# Patient Record
Sex: FEMALE | Race: BLACK OR AFRICAN AMERICAN | NOT HISPANIC OR LATINO | Employment: UNEMPLOYED | URBAN - METROPOLITAN AREA
[De-identification: names, ages, dates, MRNs, and addresses within clinical notes are randomized per-mention and may not be internally consistent; named-entity substitution may affect disease eponyms.]

---

## 2017-05-23 ENCOUNTER — OFFICE VISIT (OUTPATIENT)
Dept: FAMILY MEDICINE | Facility: CLINIC | Age: 57
End: 2017-05-23

## 2017-05-23 VITALS
SYSTOLIC BLOOD PRESSURE: 129 MMHG | BODY MASS INDEX: 39.73 KG/M2 | OXYGEN SATURATION: 100 % | WEIGHT: 224.2 LBS | DIASTOLIC BLOOD PRESSURE: 85 MMHG | HEIGHT: 63 IN | HEART RATE: 72 BPM | TEMPERATURE: 98.2 F

## 2017-05-23 DIAGNOSIS — Z01.818 PREOP GENERAL PHYSICAL EXAM: Primary | ICD-10-CM

## 2017-05-23 DIAGNOSIS — Z53.9 ERRONEOUS ENCOUNTER--DISREGARD: Primary | ICD-10-CM

## 2017-05-23 LAB
BUN SERPL-MCNC: 10 MG/DL (ref 7–30)
CALCIUM SERPL-MCNC: 9.2 MG/DL (ref 8.5–10.4)
CHLORIDE SERPLBLD-SCNC: 105 MMOL/L (ref 94–109)
CO2 SERPL-SCNC: 29 MMOL/L (ref 20–32)
CREAT SERPL-MCNC: 0.7 MG/DL (ref 0.6–1.3)
EGFR CALCULATED (BLACK REFERENCE): >90 ML/MIN
EGFR CALCULATED (NON BLACK REFERENCE): >90 ML/MIN
GLUCOSE SERPL-MCNC: 124 MG/DL (ref 60–109)
HCT VFR BLD AUTO: 43.5 % (ref 35–47)
HEMOGLOBIN: 13.7 G/DL (ref 11.7–15.7)
MCH RBC QN AUTO: 29.7 PG (ref 26.5–35)
MCHC RBC AUTO-ENTMCNC: 31.5 G/DL (ref 32–36)
MCV RBC AUTO: 94.4 FL (ref 78–100)
PLATELET # BLD AUTO: 238 K/UL (ref 150–450)
POTASSIUM SERPL-SCNC: 3.9 MMOL/L (ref 3.4–5.3)
RBC # BLD AUTO: 4.61 M/UL (ref 3.8–5.2)
SODIUM SERPL-SCNC: 142 MMOL/L (ref 133–144)
WBC # BLD AUTO: 7.7 K/UL (ref 4–11)

## 2017-05-23 NOTE — NURSING NOTE
Establish care--will come back for establish care  Mammogram--decline  Colonoscopy & fit test--pt decline  MARIAH--pt could not remember her previous clinic that she use to go to so unable to obtain.

## 2017-05-23 NOTE — MR AVS SNAPSHOT
After Visit Summary   5/23/2017    Bear Obregon    MRN: [id number]           Patient Information     Date Of Birth          12/22/1959        Visit Information        Provider Department      5/23/2017 9:00 AM Jessica Almanza APRN CNP [facility]        Today's Diagnoses     Preop general physical exam    -  1      Care Instructions      Presurgery Checklist  You are scheduled to have surgery. The healthcare staff will try to make your stay comfortable. Use the guidelines below to remind yourself what to do before surgery. Be sure to follow any specific pre-op instructions from your surgeon or nurse.   Preparing for Surgery  Ask your surgeon if you ll need a blood transfusion during surgery and if so, how to prepare for it. In some cases, you can donate blood before surgery. If needed, this blood can be given back (transfused) to you during or after surgery.  If you are having abdominal surgery, ask what you need to do to clear your bowel.  Tell your surgeon if you have allergies to any medications or foods.  Arrange for an adult family member or friend to drive you home after surgery. If possible, have someone ready to help you at home as you recover.  Call the surgeon if you get a cold, fever, sore throat, diarrhea, or other health problem just before surgery. Your surgeon can decide whether or not to postpone the surgery.  Medications  Tell your surgeon about all medications you take, including prescription and over-the-counter products such as herbal remedies and vitamins. Ask if you should continue taking them.  If you take ibuprofen, naproxen, or  blood thinners  such as aspirin, clopidogrel (Plavix), or warfarin (Coumadin), ask your surgeon whether you should stop taking them and how long before surgery you should stop.  You may be told to take antibiotics just before surgery to prevent infection. If so, follow instructions carefully on how to take them.  If you are told to  take medications called anticoagulants to prevent blood clots after surgery, be sure to follow the instructions on how to take them.  Stop Smoking  If you smoke, healing may take longer. So at least 2 week(s) before surgery, stop smoking.  Bathing or Showering Before Surgery  If instructed, wash with antibacterial soap. Afterward, do not use lotions or powders.  If you are having surgery on the head, you may be asked to shampoo with antibacterial soap. Follow instructions for doing so.  Do Not Remove Hair from the Surgery Site  Do not shave hair from the incision site, unless you are given specific instructions to do so. Usually, if hair needs to be removed, it will be done at the hospital right before surgery.  Don t Eat or Drink  Your doctor will tell you when to stop eating and drinking. If you do not follow your doctor's instructions, your procedure may be postponed or rescheduled for another day.  If your surgeon tells you to continue any medications, take them with small sips of water.  You can brush your teeth and rinse your mouth, but don t swallow any water.  Day of Surgery  Do not wear makeup. Do not use perfume, deodorant, or hairspray. Remove nail polish and artificial nails.  Leave jewelry (including rings), watches, and other valuables at home.  Be sure to bring health insurance cards or forms and a photo ID.  Bring a list of your medications (include the name, dose, how often you take them, and the time last dose was taken).  Arrive on time at the hospital or surgery facility.        Follow-ups after your visit        Follow-up notes from your care team     Return in about 4 weeks (around 6/20/2017), or if symptoms worsen or fail to improve, for Lab Work, Routine Visit, Physical Exam, BP Recheck.      Who to contact     Please call your clinic at [phone] to:    Ask questions about your health    Make or cancel appointments    Discuss your medicines    Learn about your test results    Speak to  "your doctor   If you have compliments or concerns about an experience at your clinic, or if you wish to file a complaint, please contact [facility] Patient Relations at [phone] or email us at [email or web address]         Additional Information About Your Visit        Care EveryWhere ID     This is your Care EveryWhere ID. This could be used by other organizations to access your [facility] medical records  [id number]        Your Vitals Were     Pulse Temperature Height Pulse Oximetry BMI (Body Mass Index)       72 98.2  F (36.8  C) (Oral) 5' 3\" (160 cm) 100% 39.72 kg/m2        Blood Pressure from Last 3 Encounters:   05/23/17 129/85   09/03/08 105/69   08/12/08 122/74    Weight from Last 3 Encounters:   05/23/17 224 lb 3.2 oz (101.7 kg)   09/03/08 229 lb (103.9 kg)   08/12/08 234 lb (106.1 kg)              We Performed the Following     Basic Metabolic Panel ([facility]) - Results < 1 hr     CBC with Plt ([facility])     EKG 12-lead complete w/read - Clinics        Primary Care Provider    [facility] Other Clinic                Thank you!     Thank you for choosing [facility]  for your care. Our goal is always to provide you with excellent care. Hearing back from our patients is one way we can continue to improve our services. Please take a few minutes to complete the written survey that you may receive in the mail after your visit with us. Thank you!             Your Updated Medication List - Protect others around you: Learn how to safely use, store and throw away your medicines at www.disposemymeds.org.          This list is accurate as of: 5/23/17  6:52 PM.  Always use your most recent med list.                   Brand Name Dispense Instructions for use    omeprazole 20 MG CR capsule    priLOSEC    90 capsule    Take 1 capsule (20 mg) by mouth daily       sertraline 50 MG tablet    ZOLOFT    90 tablet    Take 1 tablet (50 mg) by mouth daily         "

## 2017-05-23 NOTE — LETTER
May 24, 2017      Bear Obregon  [address] [place] [address]        Dear Zernia,    Hello, Zernia,    Your lab results were normal, with the exception of a mildly elevated glucose, not surprising as it was drawn this morning, without your having a chance to fast for 8 hours or more.  Electrolytes and kidney function are normal. You have no signs of anemia or obvious infection on your blood count.    I have forwarded the lab results with your pre-op notes that were completed this afternoon, after our computer access returned!    Please call if you have questions, at [phone].    Best wishes on a complete recovery,    Please see below for your test results.    Resulted Orders   CBC with Plt ([facility])   Result Value Ref Range    WBC 7.7 4.0 - 11.0 K/uL    RBC 4.61 3.80 - 5.20 M/uL    Hemoglobin 13.7 11.7 - 15.7 g/dL    Hematocrit 43.5 35.0 - 47.0 %    MCV 94.4 78.0 - 100.0 fL    MCH 29.7 26.5 - 35.0 pg    MCHC 31.5 (L) 32.0 - 36.0 g/dL    Platelets 238.0 150.0 - 450.0 K/uL   Basic Metabolic Panel ([facility]) - Results < 1 hr   Result Value Ref Range    Glucose 124.0 (H) 60.0 - 109.0 mg/dL    Urea Nitrogen 10.0 7.0 - 30.0 mg/dL    Creatinine 0.7 0.6 - 1.3 mg/dL    Sodium 142.0 133.0 - 144.0 mmol/L    Potassium 3.9 3.4 - 5.3 mmol/L    Chloride 105.0 94.0 - 109.0 mmol/L    Carbon Dioxide 29.0 20.0 - 32.0 mmol/L    Calcium 9.2 8.5 - 10.4 mg/dL    eGFR Calculated (Non Black Reference) >90 >60.0 mL/min    eGFR Calculated (Black Reference) >90 >60.0 mL/min       If you have any questions, please call the clinic to make an appointment.    Sincerely,    CLAUDIA Erazo CNP

## 2017-05-23 NOTE — PROGRESS NOTES
[facility] [address] [place] [address]  Phone: [phone]  Fax: [phone]    5/23/2017    Adult PRE-OP Evaluation:  Bear Obregon, 12/22/1959 presents for pre-operative evaluation and assessment as requested by Dr Álvarez  At [facility], prior to undergoing surgery/procedure for treatment of cartilage tear of left knee.  Proposed procedure: Arthroscopy Left knee.    Date of Surgery/ Procedure: 5/31/17  Hospital/Surgical Facility: [facility]     Primary Physician: Has had care in the [facility] system but most recently has not had a PCP  Type of Anesthesia Anticipated: General  History of anesthesia complications: NONE  History of  abnormal bleeding: NONE   History of blood transfusions: NO  Patient has a Health Care Directive or Living Will:  NO    Preoperative Questions   1. NO - Do you have a history of heart attack, stroke, stent, bypass or surgery on an artery in the head, neck, heart or legs?  2. NO - Do you ever have any pain or discomfort in your chest?  3. NO - Have you ever had a severe pain across the front of your chest lasting for half an hour or more?  4. NO - Do you have a history of Congestive Heart Failure?  5. NO - Are you troubled by shortness of breath when: walking on the level/ up a slight hill/ at night?  6. NO - Does your chest ever sound wheezy or whistling?  7. NO - Do you currently have a cold, bronchitis or other respiratory infection?  8. NO - Have you had a cold, bronchitis or other respiratory infection within the last 2 weeks?  9. NO - Do you usually have a cough?  10. NO - Do you sometimes get pains in the calves of your legs when you walk?  11. YES - Do you or anyone in your family have previous history of blood clots?   Patient had a blood clot of her left foot at age 23, and the clot traveled up to the left calf.  No recurrence in recent years.  12. NO - Do you or does anyone in your family have a serious bleeding problem such as  "prolonged bleeding following surgeries or cuts?  13. NO - Have you ever had problems with anemia or been told to take iron pills?  14. NO - Have you had any abnormal blood loss such as black, tarry or bloody stools, or abnormal vaginal bleeding?  15. NO - Have you ever had a blood transfusion?  16. NO - Have you or any of your relatives ever had problems with anesthesia?  17. NO - Do you have sleep apnea, excessive snoring or daytime drowsiness?  18. NO - Do you have any prosthetic heart valves?  19. NO - Do you have prosthetic joints?  20. NO - Is there any chance that you may be pregnant?  Past Medical History:   Diagnosis Date     Depressive disorder, not elsewhere classified     divorce, no meds     Disorders of bursae and tendons in shoulder region, unspecified      Disorders of bursae and tendons in shoulder region, unspecified      Other and unspecified hyperlipidemia 4/17/2007     Personal history of urinary calculi      Superficial injury of cornea     left     Tobacco use disorder     quit     Patient Active Problem List   Diagnosis     Hyperlipidemia     Tobacco use disorder     Allergic rhinitis     Obesity     OTC products: NSAIDS- ibuprofen- last yesterday    Allergies   Allergen Reactions     Sulfa Drugs Hives and Itching     Hives and itching     Latex Allergy: NO    Social History   Patient is single, and mother of 3 children- 11 son and 2 daughters, ages 37-41.  Is disabled due to chronic pain. Used to work as a patient care assistant in the remote past.   Smokes cigarettes, \"less than I used to smoke.\" Has no problems with alcohol or illicit drug use.      REVIEW OF SYSTEMS:   Constitutional, HEENT, cardiovascular, pulmonary, gi and gu systems are negative, except as otherwise noted.    EXAM:   Patient Vitals for the past 24 hrs:   BP Temp Temp src Pulse SpO2 Height Weight   05/23/17 0915 129/85 98.2  F (36.8  C) Oral 72 100 % 5' 3\" (160 cm) 224 lb 3.2 oz (101.7 kg)     Body mass index is 39.72 " kg/(m^2).  GENERAL: Alert, overweight and in no resting distress  EYES: Eyes grossly normal to inspection, extraocular movements - intact, and PERRL  HENT: ear canals- normal; TMs- normal; Nose- normal; Mouth- no ulcers, no lesions  NECK: no tenderness, no adenopathy, no asymmetry, no masses, no stiffness; thyroid- normal to palpation  RESP: lungs clear to auscultation - no rales, no rhonchi, no wheezes  CV: regular rates and rhythm, normal S1 S2, no S3 or S4 and no murmur, no click or rub -  ABDOMEN: soft, no tenderness, no  hepatosplenomegaly, no masses, normal bowel sounds  MS: extremities- no gross deformities noted, no edema  SKIN: no suspicious lesions, no rashes  NEURO: strength and tone- normal, sensory exam- grossly normal, mentation- intact, speech- normal, reflexes- symmetric  PSYCH: Alert and oriented times 3; speech- coherent , normal rate and volume; able to articulate logical thoughts      DIAGNOSTICS:      EKG: sinus bradycardia, rate 59, normal axis, normal intervals, no acute ST/T changes c/w ischemia,   no prior tracings available    RISK ASSESSMENT:     Cardiovascular Risk:  -Patient is able to perform ADL's without assistance without chest pain.  -The patient does not have chest pain at rest.  -Patient does not have a history of congestive heart failure.    -The patient does not have a history of stroke and does not have a history of valvular disease.    Pulmonary Risk:  -In terms of risk factors for pulmonary complication, the patient has no risk factors    Perioperative Complications:  -The patient has a history of a blood clot to her LLE in the remote past x1. This has not recurred in spite of   Appendectomy,Hysterectomy() and  deliveries x3. I n addition has had  Bilateral rotator cuff surgeries without complications.  -The patient has not had complications from surgeries.    -The patient does not have a family history of any known anesthesia or surgical complications.       IMPRESSION:   Reason for surgery/procedure: Arthroscopic surgery L knee for treatment of L knee pain  The proposed surgical procedure is considered INTERMEDIATE risk.    For above listed surgery and anesthesia:   Patient is at moderate risk for surgery/procedure and perioperative/procedure complications.    RECOMMENDATIONS:     Labs:  Last Basic Metabolic Panel:  Lab Results   Component Value Date    .0 05/23/2017      Lab Results   Component Value Date    POTASSIUM 3.9 05/23/2017     Lab Results   Component Value Date    CHLORIDE 105.0 05/23/2017       Lab Results   Component Value Date    CO2 29.0 05/23/2017     Lab Results   Component Value Date    BUN 10.0 05/23/2017     Lab Results   Component Value Date    CR 0.7 05/23/2017     Lab Results   Component Value Date    .0 05/23/2017     CBC RESULTS:   Recent Labs   Lab Test  05/23/17   1525   HGB  13.7   HCT  43.5   MCV  94.4   MCH  29.7   MCHC  31.5*   WBC                                                                              7.7  Platelets                                                                        238    Fasting:  NPO for 12 hours prior to surgery    Preop Plan:  --Approval given to proceed with proposed procedure, without further diagnostic evaluation    Medications:  Patient should take their regular medications (Sertraline)  the morning of surgery unless otherwise instructed.    Hold aspirin 7 days prior to surgery.  Hold ibuprofen for 5 days prior.      CLAUDIA Erazo CNP    Please contact our office if there are any further questions or information required about this patient.

## 2017-05-23 NOTE — PATIENT INSTRUCTIONS
Presurgery Checklist  You are scheduled to have surgery. The healthcare staff will try to make your stay comfortable. Use the guidelines below to remind yourself what to do before surgery. Be sure to follow any specific pre-op instructions from your surgeon or nurse.   Preparing for Surgery  Ask your surgeon if you ll need a blood transfusion during surgery and if so, how to prepare for it. In some cases, you can donate blood before surgery. If needed, this blood can be given back (transfused) to you during or after surgery.  If you are having abdominal surgery, ask what you need to do to clear your bowel.  Tell your surgeon if you have allergies to any medications or foods.  Arrange for an adult family member or friend to drive you home after surgery. If possible, have someone ready to help you at home as you recover.  Call the surgeon if you get a cold, fever, sore throat, diarrhea, or other health problem just before surgery. Your surgeon can decide whether or not to postpone the surgery.  Medications  Tell your surgeon about all medications you take, including prescription and over-the-counter products such as herbal remedies and vitamins. Ask if you should continue taking them.  If you take ibuprofen, naproxen, or  blood thinners  such as aspirin, clopidogrel (Plavix), or warfarin (Coumadin), ask your surgeon whether you should stop taking them and how long before surgery you should stop.  You may be told to take antibiotics just before surgery to prevent infection. If so, follow instructions carefully on how to take them.  If you are told to take medications called anticoagulants to prevent blood clots after surgery, be sure to follow the instructions on how to take them.  Stop Smoking  If you smoke, healing may take longer. So at least 2 week(s) before surgery, stop smoking.  Bathing or Showering Before Surgery  If instructed, wash with antibacterial soap. Afterward, do not use lotions or powders.  If you  are having surgery on the head, you may be asked to shampoo with antibacterial soap. Follow instructions for doing so.  Do Not Remove Hair from the Surgery Site  Do not shave hair from the incision site, unless you are given specific instructions to do so. Usually, if hair needs to be removed, it will be done at the hospital right before surgery.  Don t Eat or Drink  Your doctor will tell you when to stop eating and drinking. If you do not follow your doctor's instructions, your procedure may be postponed or rescheduled for another day.  If your surgeon tells you to continue any medications, take them with small sips of water.  You can brush your teeth and rinse your mouth, but don t swallow any water.  Day of Surgery  Do not wear makeup. Do not use perfume, deodorant, or hairspray. Remove nail polish and artificial nails.  Leave jewelry (including rings), watches, and other valuables at home.  Be sure to bring health insurance cards or forms and a photo ID.  Bring a list of your medications (include the name, dose, how often you take them, and the time last dose was taken).  Arrive on time at the hospital or surgery facility.    Pre-op faxed to fax number :  [phone]  Location :  [facility]  Date of Surgery :  5/31/2017  By/Date :  ZOHAIB Griffith 5/24/2017

## 2017-05-25 ASSESSMENT — PATIENT HEALTH QUESTIONNAIRE - PHQ9: SUM OF ALL RESPONSES TO PHQ QUESTIONS 1-9: 17

## 2018-02-05 ENCOUNTER — OFFICE VISIT (OUTPATIENT)
Dept: FAMILY MEDICINE | Facility: CLINIC | Age: 58
End: 2018-02-05
Payer: MEDICARE

## 2018-02-05 VITALS
WEIGHT: 226.6 LBS | HEART RATE: 76 BPM | SYSTOLIC BLOOD PRESSURE: 144 MMHG | HEIGHT: 62 IN | BODY MASS INDEX: 41.7 KG/M2 | RESPIRATION RATE: 18 BRPM | DIASTOLIC BLOOD PRESSURE: 81 MMHG | TEMPERATURE: 98.1 F | OXYGEN SATURATION: 100 %

## 2018-02-05 DIAGNOSIS — F43.20 ADJUSTMENT DISORDER, UNSPECIFIED TYPE: ICD-10-CM

## 2018-02-05 DIAGNOSIS — M25.531 RIGHT WRIST PAIN: Primary | ICD-10-CM

## 2018-02-05 DIAGNOSIS — K21.9 GASTROESOPHAGEAL REFLUX DISEASE, ESOPHAGITIS PRESENCE NOT SPECIFIED: ICD-10-CM

## 2018-02-05 RX ORDER — INDOMETHACIN 25 MG/1
25 CAPSULE ORAL 2 TIMES DAILY WITH MEALS
Qty: 14 CAPSULE | Refills: 0 | Status: SHIPPED | OUTPATIENT
Start: 2018-02-05 | End: 2018-02-28

## 2018-02-05 RX ORDER — LIDOCAINE 50 MG/G
OINTMENT TOPICAL PRN
Qty: 100 G | Refills: 1 | Status: SHIPPED | OUTPATIENT
Start: 2018-02-05 | End: 2018-02-28

## 2018-02-05 RX ORDER — KETOROLAC TROMETHAMINE 10 MG/1
10 TABLET, FILM COATED ORAL EVERY 6 HOURS PRN
Qty: 30 TABLET | Refills: 0 | Status: SHIPPED | OUTPATIENT
Start: 2018-02-05 | End: 2018-02-28

## 2018-02-05 NOTE — PROGRESS NOTES
"[facility]        Subjective     CC: Patient presents with:  Musculoskeletal Problem: Right wrist pain and swelling has been using creams and ibuprofen. Alternating heat and ice. Off and on x2 months and steady for 1 week. Had carpal tunnel surgery 5 years ago  Med Rec: Complete. Has not been taking medication because no refills. Would like refills on both medication      HPI: Bear Obregon is a 58 year old female with history of obesity.      She presents today with progressive right wrist pain.  She says she first noted it about 2 months ago but then became much worse over the last week.  She is wondering if it is related to her carpal tunnel surgery which she had 5 years ago.  She has been using ibuprofen along with some creams to try to help cut down on the pain.  She is also taken some over-the-counter Tylenol.  She denies any fevers or chills.  She says the pain is focused at her wrist and does not radiate up and down her arm.      ROS: constitutional, cardiovascular, respiratory, GI, , MSK systems reviewed and negative except as noted above.    Social: Ex-smoker          Objective   /81 (BP Location: Right arm, Patient Position: Sitting, Cuff Size: Adult Large)  Pulse 76  Temp 98.1  F (36.7  C) (Oral)  Resp 18  Ht 5' 2.25\" (158.1 cm)  Wt 226 lb 9.6 oz (102.8 kg)  SpO2 100%  BMI 41.11 kg/m2 Body mass index is 41.11 kg/(m^2).  General: Overweight female in no acute distress  Extremities: Right wrist with significant tenderness to only light palpation about 1 cm proximal to the radial head along dorsal aspect.  She is able to flex her thumb without much difficulty but it is quite painful.           Assessment & Plan     1. Right wrist pain  Pain is almost allodynic in nature.  This could be gout but seems unlikely.  More likely suspect some sort of tendinitis.   Rx sent in for indomethacin initially but this was not covered by insurance and so we switched to Toradol.  Hope " was to try different kind of NSAIDs than the ibuprofen that she has been using.  She will follow-up later this week for reevaluation.  - indomethacin (INDOCIN) 25 MG capsule; Take 1 capsule (25 mg) by mouth 2 times daily (with meals) Do not take on the same day as ibuprofen  Dispense: 14 capsule; Refill: 0  - lidocaine (XYLOCAINE) 5 % ointment; Apply topically as needed for moderate pain (Patient not taking: Reported on 2/9/2018)  Dispense: 100 g; Refill: 1  - ketorolac (TORADOL) 10 MG tablet; Take 1 tablet (10 mg) by mouth every 6 hours as needed for moderate pain Do not take ibuprofen on the same day  Dispense: 30 tablet; Refill: 0    2. Gastroesophageal reflux disease, esophagitis presence not specified  We did not have time to evaluate this today formally.  Her prior PPI was refilled.  She will need to follow-up further to discuss this.  - omeprazole (PRILOSEC) 20 MG CR capsule; Take 1 capsule (20 mg) by mouth daily  Dispense: 90 capsule; Refill: 1    3. Adjustment disorder, unspecified type  Again did not have time to look into this further.  She will need to follow-up to investigate this further.  - sertraline (ZOLOFT) 50 MG tablet; Take 1 tablet (50 mg) by mouth daily  Dispense: 90 tablet; Refill: 1    Precepted today with: MD Nish Calvin MD    -------  PMH:  Patient Active Problem List   Diagnosis     Hyperlipidemia     Tobacco use disorder     Allergic rhinitis     Obesity      Current Outpatient Prescriptions   Medication     sertraline (ZOLOFT) 50 MG tablet     omeprazole (PRILOSEC) 20 MG CR capsule     No current facility-administered medications for this visit.       ALLERGIES: Sulfa drugs

## 2018-02-05 NOTE — PROGRESS NOTES
Preceptor Attestation:  Patient's case reviewed and discussed with Nish Carr MD Patient seen and discussed with the resident.. I agree with assessment and plan of care.  Supervising Physician:  Willis Morrison MD  [facility]

## 2018-02-05 NOTE — MR AVS SNAPSHOT
"              After Visit Summary   2018    Bear Obregon    MRN: [id number]           Patient Information     Date Of Birth          1959        Visit Information        Provider Department      2018 11:20 AM Nish Carr MD [facility]        Today's Diagnoses     Right wrist pain    -  1    Gastroesophageal reflux disease, esophagitis presence not specified        Adjustment disorder, unspecified type           Follow-ups after your visit        Who to contact     Please call your clinic at [phone] to:    Ask questions about your health    Make or cancel appointments    Discuss your medicines    Learn about your test results    Speak to your doctor            Additional Information About Your Visit        MyChart Information     TixAlertt is an electronic gateway that provides easy, online access to your medical records. With [employer]it[employer], you can request a clinic appointment, read your test results, renew a prescription or communicate with your care team.     To sign up for TixAlertt visit the website at [email or web address]   You will be asked to enter the access code listed below, as well as some personal information. Please follow the directions to create your username and password.     Your access code is: [email or web address]  Expires: 5/10/2018  9:21 AM     Your access code will  in 90 days. If you need help or a new code, please contact your [facility] or call [phone] for assistance.        Care EveryWhere ID     This is your Care EveryWhere ID. This could be used by other organizations to access your [place] medical records  [id number]        Your Vitals Were     Pulse Temperature Respirations Height Pulse Oximetry BMI (Body Mass Index)    76 98.1  F (36.7  C) (Oral) 18 5' 2.25\" (158.1 cm) 100% 41.11 kg/m2       Blood Pressure from Last 3 Encounters:   18 155/80   18 144/81   17 129/85    Weight from Last 3 " Encounters:   02/09/18 231 lb (104.8 kg)   02/05/18 226 lb 9.6 oz (102.8 kg)   05/23/17 224 lb 3.2 oz (101.7 kg)              Today, you had the following     No orders found for display         Today's Medication Changes          These changes are accurate as of 2/5/18 11:59 PM.  If you have any questions, ask your nurse or doctor.               Start taking these medicines.        Dose/Directions    indomethacin 25 MG capsule   Commonly known as:  INDOCIN   Used for:  Right wrist pain   Started by:  Nish Carr MD        Dose:  25 mg   Take 1 capsule (25 mg) by mouth 2 times daily (with meals) Do not take on the same day as ibuprofen   Quantity:  14 capsule   Refills:  0       ketorolac 10 MG tablet   Commonly known as:  TORADOL   Used for:  Right wrist pain   Started by:  Nish Carr MD        Dose:  10 mg   Take 1 tablet (10 mg) by mouth every 6 hours as needed for moderate pain Do not take ibuprofen on the same day   Quantity:  30 tablet   Refills:  0       lidocaine 5 % ointment   Commonly known as:  XYLOCAINE   Used for:  Right wrist pain   Started by:  Nish Carr MD        Apply topically as needed for moderate pain   Quantity:  100 g   Refills:  1            Where to get your medicines      These [facility] [address] [place] [address] [address] [facility] [address]  [address] [facility] [address]    Hours:  24-hours Phone:  [phone]     indomethacin 25 MG capsule    ketorolac 10 MG tablet    lidocaine 5 % ointment    omeprazole 20 MG CR capsule    sertraline 50 MG tablet                Primary Care Provider Office Phone # Fax #    Nish Carr [phone]       [facility] [address] [place] [address]        Equal Access to Services     MIKAYLA CRAMER AH: Shaila mclaughlino Sotrinidad, waaxda luqadaha, qaybta kaalmada delia, girish granda ah.  So [facility] [phone].    ATENCIÓN: Si tryo kathleen, tiene a frye disposición servicios gratuitos de asistencia lingüística. Salty barnes [phone].    We comply with applicable federal civil rights laws and [place] laws. We do not discriminate on the basis of race, color, national origin, age, disability, sex, sexual orientation, or gender identity.            Thank you!     Thank you for choosing [facility]  for your care. Our goal is always to provide you with excellent care. Hearing back from our patients is one way we can continue to improve our services. Please take a few minutes to complete the written survey that you may receive in the mail after your visit with us. Thank you!             Your Updated Medication List - Protect others around you: Learn how to safely use, store and throw away your medicines at www.disposemymeds.org.          This list is accurate as of 2/5/18 11:59 PM.  Always use your most recent med list.                   Brand Name Dispense Instructions for use Diagnosis    indomethacin 25 MG capsule    INDOCIN    14 capsule    Take 1 capsule (25 mg) by mouth 2 times daily (with meals) Do not take on the same day as ibuprofen    Right wrist pain       ketorolac 10 MG tablet    TORADOL    30 tablet    Take 1 tablet (10 mg) by mouth every 6 hours as needed for moderate pain Do not take ibuprofen on the same day    Right wrist pain       lidocaine 5 % ointment    XYLOCAINE    100 g    Apply topically as needed for moderate pain    Right wrist pain       omeprazole 20 MG CR capsule    priLOSEC    90 capsule    Take 1 capsule (20 mg) by mouth daily    Gastroesophageal reflux disease, esophagitis presence not specified       sertraline 50 MG tablet    ZOLOFT    90 tablet    Take 1 tablet (50 mg) by mouth daily    Adjustment disorder, unspecified type

## 2018-02-09 ENCOUNTER — RECORDS - HEALTHEAST (OUTPATIENT)
Dept: ADMINISTRATIVE | Facility: OTHER | Age: 58
End: 2018-02-09

## 2018-02-09 ENCOUNTER — OFFICE VISIT (OUTPATIENT)
Dept: FAMILY MEDICINE | Facility: CLINIC | Age: 58
End: 2018-02-09
Payer: MEDICARE

## 2018-02-09 ENCOUNTER — AMBULATORY - HEALTHEAST (OUTPATIENT)
Dept: ADMINISTRATIVE | Facility: REHABILITATION | Age: 58
End: 2018-02-09

## 2018-02-09 VITALS
OXYGEN SATURATION: 98 % | BODY MASS INDEX: 39.44 KG/M2 | WEIGHT: 231 LBS | HEART RATE: 63 BPM | RESPIRATION RATE: 16 BRPM | SYSTOLIC BLOOD PRESSURE: 155 MMHG | DIASTOLIC BLOOD PRESSURE: 80 MMHG | HEIGHT: 64 IN | TEMPERATURE: 97.4 F

## 2018-02-09 DIAGNOSIS — M25.531 RIGHT WRIST PAIN: ICD-10-CM

## 2018-02-09 DIAGNOSIS — R03.0 ELEVATED BLOOD PRESSURE READING WITHOUT DIAGNOSIS OF HYPERTENSION: ICD-10-CM

## 2018-02-09 DIAGNOSIS — Z13.9 SCREENING FOR CONDITION: ICD-10-CM

## 2018-02-09 DIAGNOSIS — M65.4 DE QUERVAIN'S DISEASE (TENOSYNOVITIS): Primary | ICD-10-CM

## 2018-02-09 DIAGNOSIS — M65.4 DE QUERVAIN'S DISEASE (RADIAL STYLOID TENOSYNOVITIS): ICD-10-CM

## 2018-02-09 DIAGNOSIS — M65.4 DE QUERVAIN'S DISEASE (TENOSYNOVITIS): ICD-10-CM

## 2018-02-09 RX ORDER — NAPROXEN 500 MG/1
500 TABLET ORAL 2 TIMES DAILY WITH MEALS
Qty: 60 TABLET | Refills: 0 | Status: SHIPPED | OUTPATIENT
Start: 2018-02-09 | End: 2018-02-09

## 2018-02-09 NOTE — LETTER
April 23, 2018      Bear Obregon  [address] [place] [address]        Dear Bear,    Your mammogram was normal.    Sincerely,    Nish Carr MD

## 2018-02-09 NOTE — PATIENT INSTRUCTIONS
Massage your arm by your elbow to help relax your   Continue warm water as needed  Physical therapy - we will get this set up today  Freeze a renetta cup and massage the ice in      [facility]:  [phone]    Referral for ( TEST )  :      Colonoscopy   LOCATION/PLACE/Provider :    [facility]  DATE & TIME :     2- at 8:15am  PHONE :     [phone]  Appointment made by clinic staff/:    Jessica

## 2018-02-09 NOTE — PROGRESS NOTES
HPI:   Bear Obregon is a 58 year old  female with PMH of carpal tunnel surgery who presents with wrist pain.     Was seen in clinic Monday. Has had wrist pain for months off and on. Has tried many OTC muscle ache creams and patches. Since seen on Monday, wrist pain is still painful and still swollen. Had tried ice but it hasn't worked. Still achy and tender. Hasn't gotten better despite taking toradol. Denies trauma, no finger numbness, tingling. No finger discoloration. Has not noted any rashes. Pain keeps her up at night.          PMHX:     Patient Active Problem List   Diagnosis     Hyperlipidemia     Tobacco use disorder     Allergic rhinitis     Obesity       Current Outpatient Prescriptions   Medication Sig Dispense Refill     order for DME Equipment being ordered: Right wrist splint with thumb spica 1 Device 0     naproxen (NAPROSYN) 500 MG tablet Take 1 tablet (500 mg) by mouth 2 times daily (with meals) 60 tablet 0     omeprazole (PRILOSEC) 20 MG CR capsule Take 1 capsule (20 mg) by mouth daily 90 capsule 1     sertraline (ZOLOFT) 50 MG tablet Take 1 tablet (50 mg) by mouth daily 90 tablet 1     indomethacin (INDOCIN) 25 MG capsule Take 1 capsule (25 mg) by mouth 2 times daily (with meals) Do not take on the same day as ibuprofen 14 capsule 0     ketorolac (TORADOL) 10 MG tablet Take 1 tablet (10 mg) by mouth every 6 hours as needed for moderate pain Do not take ibuprofen on the same day 30 tablet 0     lidocaine (XYLOCAINE) 5 % ointment Apply topically as needed for moderate pain (Patient not taking: Reported on 2/9/2018) 100 g 1       Social History   Substance Use Topics     Smoking status: Former Smoker     Packs/day: 0.50     Years: 15.00     Types: Cigarettes     Quit date: 9/3/2006     Smokeless tobacco: Never Used     Alcohol use No       Social History     Social History Narrative       Allergies   Allergen Reactions     Sulfa Drugs Hives, Itching and Swelling     Hives and itching  "      No results found for this or any previous visit (from the past 24 hour(s)).         Review of Systems:   See HPI.          Physical Exam:     Vitals:    02/09/18 0812   BP: 155/80   Pulse: 63   Resp: 16   Temp: 97.4  F (36.3  C)   TempSrc: Oral   SpO2: 98%   Weight: 231 lb (104.8 kg)   Height: 5' 4\" (162.6 cm)     Body mass index is 39.65 kg/(m^2).    General: Alert, well-appearing female in NAD  Ext: Warm, well perfused, 2+ BL radial pulses.  Hand: +Finkelstein test. Pain on distal lateral wrist to light touch. Full sensation to distal fingers, full ROM with flexion and extension.   Skin: No rash on limited skin exam      Assessment and Plan   De Quervain's disease (tenosynovitis)  Patient's history and +Finkelstein test make De Quervian's disease most likely. Location of pain is also consistent with this diagnoses. Recommend:  -Night splints, thumb spica  - OT eval for massage and consideration of ionophoresis   -Ice massages   - offered injection today, patient declined but will consider moving forward  - change to naproxen 500mg BID for easier dosing then Toradol; if she continues on this dose at her next visit should obtain a BMP to check her creatinine.  - f/u 1 month, or earlier if worsening    Set up for colonoscopy and mammograms today.     Elevated BP: Noted today and on her visit earlier this week.  We had planned to repeat it before she left today but it appears that was not charted.  We will need to repeat at her next visit.  I suspect the elevation this week is mostly due to the pain she has been having.    Bebo Forbes MS4  In supervising the medical student, I saw and evaluated the patient with the student and personally performed all aspects of the history and physical.  I have reviewed and verified that the documentation is correct and complete. My revisions incorporated into above note.    Precepted with: MD Nish Bear MD R3              "

## 2018-02-09 NOTE — MR AVS SNAPSHOT
After Visit Summary   2/9/2018    Bear Obregon    MRN: [id number]           Patient Information     Date Of Birth          12/22/1959        Visit Information        Provider Department      2/9/2018 8:20 AM Nish Carr MD [facility]        Today's Diagnoses     De Quervain's disease (tenosynovitis)    -  1    Screening for condition          Care Instructions    Massage your arm by your elbow to help relax your   Continue warm water as needed  Physical therapy - we will get this set up today  Freeze a renetta cup and massage the ice in      [facility]:  [phone]          Follow-ups after your visit        Additional Services     GASTROENTEROLOGY ADULT REF PROCEDURE ONLY       Last Lab Result: Creatinine (mg/dL)       Date                     Value                 05/23/2017               0.7              ----------  Body mass index is 39.65 kg/(m^2).     Needed:  No  Language:  English    Patient will be contacted to schedule procedure.     Please be aware that coverage of these services is subject to the terms and limitations of your health insurance plan.  Call member services at your health plan with any benefit or coverage questions.  Any procedures must be performed at a [place] facility OR coordinated by your clinic's referral office.    Please bring the following with you to your appointment:    (1) Any X-Rays, CTs or MRIs which have been performed.  Contact the facility where they were done to arrange for  prior to your scheduled appointment.    (2) List of current medications   (3) This referral request   (4) Any documents/labs given to you for this referral            OCCUPATIONAL THERAPY REFERRAL       PT/OT REFERRAL  Bear Obregon  12/22/1959  Phone #: [phone] (home)    needed: No  Language: English    OT REFERRAL  Bear Obregon  12/22/1959  Phone #: [phone] (home)    needed: No  Language:  English    PT/OT  Facility:  Per patient preferance    History: Suspected de quervains    Precautions/Contraindications: None    Imaging Studies: none  Surgical Procedure/Test Results: None    Treatment Goals:   Increase: Flexibility, Strength and ROM  Decrease: Edema and Pain    Prognosis: good    Visits: Up to 12    Evaluate: Evaluate and treat    Plan: Manual Therapy and Flexibility Exercise    Ionto/Phonophoresis - may be very helpful               PHYSICAL THERAPY REFERRAL       PT/OT REFERRAL  Bear Obregon  12/22/1959  Phone #: [phone] (home)    needed: No  Language: English    PT/OT  Facility:   [facility], P: [phone], F: [phone]    History: Suspect de quervains    Precautions/Contraindications: None    Imaging Studies: none  Surgical Procedure/Test Results: None    Treatment Goals:   Increase: Flexibility, Strength and ROM  Decrease: Edema and Pain    Prognosis: good    Visits: Up to 12    Evaluate: Evaluate and treat    Plan: Manual Therapy and Flexibility Exercise    Ionto/Phonophoresis - may be very helpful                  Future tests that were ordered for you today     Open Future Orders        Priority Expected Expires Ordered    OCCUPATIONAL THERAPY REFERRAL Routine  5/20/2018 2/9/2018    PHYSICAL THERAPY REFERRAL Routine  5/20/2018 2/9/2018            Who to contact     Please call your clinic at [phone] to:    Ask questions about your health    Make or cancel appointments    Discuss your medicines    Learn about your test results    Speak to your doctor            Additional Information About Your Visit        MyChart Information     MyPrintCloudt is an electronic gateway that provides easy, online access to your medical records. With [employer], you can request a clinic appointment, read your test results, renew a prescription or communicate with your care team.     To sign up for FRWD Technologieshart visit the website at [email or web address]   You  "will be asked to enter the access code listed below, as well as some personal information. Please follow the directions to create your username and password.     Your access code is: [email or web address]  Expires: 5/10/2018  9:21 AM     Your access code will  in 90 days. If you need help or a new code, please contact your [facility] or call [phone] for assistance.        Care EveryWhere ID     This is your Care EveryWhere ID. This could be used by other organizations to access your [place] medical records  [id number]        Your Vitals Were     Pulse Temperature Respirations Height Pulse Oximetry BMI (Body Mass Index)    63 97.4  F (36.3  C) (Oral) 16 5' 4\" (162.6 cm) 98% 39.65 kg/m2       Blood Pressure from Last 3 Encounters:   18 155/80   18 144/81   17 129/85    Weight from Last 3 Encounters:   18 231 lb (104.8 kg)   18 226 lb 9.6 oz (102.8 kg)   17 224 lb 3.2 oz (101.7 kg)              We Performed the Following     GASTROENTEROLOGY ADULT REF PROCEDURE ONLY     MA SCREENING DIGITAL BILAT          Today's Medication Changes          These changes are accurate as of 18  9:21 AM.  If you have any questions, ask your nurse or doctor.               Start taking these medicines.        Dose/Directions    naproxen 500 MG tablet   Commonly known as:  NAPROSYN   Used for:  De Quervain's disease (tenosynovitis)   Started by:  Nish Carr MD        Dose:  500 mg   Take 1 tablet (500 mg) by mouth 2 times daily (with meals)   Quantity:  60 tablet   Refills:  0       order for DME   Used for:  De Quervain's disease (tenosynovitis)   Started by:  Nish Carr MD        Equipment being ordered: Right wrist splint with thumb spica   Quantity:  1 Device   Refills:  0            Where to get your medicines      These medications were sent to [facility] [address] [facility] [address] [facility] [address]  [address] [facility] [address]    Hours:  24-hours Phone:  [phone]     naproxen 500 MG tablet         Some of these will need a paper prescription and others can be bought over the counter.  Ask your nurse if you have questions.     Bring a paper prescription for each of these medications     order for DME                Primary Care Provider Office Phone # Fax #    Nish Carr [phone]       [facility] [address] [facility] [address]        Equal Access to Services     JO ANN CRAMER : Hadii aad ku hadasho Soomaali, waaxda luqadaha, qaybta kaalmada adeegyada, waxay idiin hayaan adeeg kharash kalee . So [facility] [phone].    ATENCIÓN: Si habla español, tiene a frye disposición servicios gratuitos de asistencia lingüística. Llame al [phone].    We comply with applicable federal civil rights laws and [place] laws. We do not discriminate on the basis of race, color, national origin, age, disability, sex, sexual orientation, or gender identity.            Thank you!     Thank you for choosing [facility]  for your care. Our goal is always to provide you with excellent care. Hearing back from our patients is one way we can continue to improve our services. Please take a few minutes to complete the written survey that you may receive in the mail after your visit with us. Thank you!             Your Updated Medication List - Protect others around you: Learn how to safely use, store and throw away your medicines at www.disposemymeds.org.          This list is accurate as of 2/9/18  9:21 AM.  Always use your most recent med list.                   Brand Name Dispense Instructions for use Diagnosis    indomethacin 25 MG capsule    INDOCIN    14 capsule    Take 1 capsule (25 mg) by mouth 2 times daily (with meals) Do not take on the same day as ibuprofen    Right wrist pain       ketorolac 10 MG tablet    TORADOL    30 tablet    Take 1 tablet (10  mg) by mouth every 6 hours as needed for moderate pain Do not take ibuprofen on the same day    Right wrist pain       lidocaine 5 % ointment    XYLOCAINE    100 g    Apply topically as needed for moderate pain    Right wrist pain       naproxen 500 MG tablet    NAPROSYN    60 tablet    Take 1 tablet (500 mg) by mouth 2 times daily (with meals)    De Quervain's disease (tenosynovitis)       omeprazole 20 MG CR capsule    priLOSEC    90 capsule    Take 1 capsule (20 mg) by mouth daily    Preop general physical exam       order for DME     1 Device    Equipment being ordered: Right wrist splint with thumb spica    De Quervain's disease (tenosynovitis)       sertraline 50 MG tablet    ZOLOFT    90 tablet    Take 1 tablet (50 mg) by mouth daily    Preop general physical exam

## 2018-02-09 NOTE — NURSING NOTE
Mammogram referral- ok with   Colonoscopy or fit test- ok with   Lipid?  Offer flu vaccine-11/18/2017

## 2018-02-09 NOTE — PROGRESS NOTES
Preceptor Attestation:  Patient's case reviewed and discussed with Nish Carr MD Patient seen and discussed with the resident.. I agree with assessment and plan of care.  Supervising Physician:  Monica Rees MD  [facility]

## 2018-02-09 NOTE — TELEPHONE ENCOUNTER
The patient is requesting to dispense a 90 day supply and second requesting still pending from 02/09/18

## 2018-02-10 PROBLEM — R03.0 ELEVATED BLOOD PRESSURE READING WITHOUT DIAGNOSIS OF HYPERTENSION: Status: ACTIVE | Noted: 2018-02-10

## 2018-02-12 ASSESSMENT — MIFFLIN-ST. JEOR: SCORE: 1533.38

## 2018-02-14 RX ORDER — NAPROXEN 500 MG/1
500 TABLET ORAL 2 TIMES DAILY WITH MEALS
Qty: 180 TABLET | Refills: 1 | Status: SHIPPED | OUTPATIENT
Start: 2018-02-14 | End: 2020-11-19

## 2018-02-14 RX ORDER — INDOMETHACIN 25 MG/1
25 CAPSULE ORAL 2 TIMES DAILY WITH MEALS
Qty: 14 CAPSULE | OUTPATIENT
Start: 2018-02-14

## 2018-02-15 ENCOUNTER — SURGERY - HEALTHEAST (OUTPATIENT)
Dept: SURGERY | Facility: AMBULATORY SURGERY CENTER | Age: 58
End: 2018-02-15

## 2018-02-18 PROBLEM — Z86.0100 HISTORY OF COLONIC POLYPS: Status: ACTIVE | Noted: 2018-02-18

## 2018-02-19 ENCOUNTER — OFFICE VISIT - HEALTHEAST (OUTPATIENT)
Dept: OCCUPATIONAL THERAPY | Facility: REHABILITATION | Age: 58
End: 2018-02-19

## 2018-02-19 DIAGNOSIS — M25.531 WRIST PAIN, RIGHT: ICD-10-CM

## 2018-02-19 DIAGNOSIS — Z78.9 DECREASED ACTIVITIES OF DAILY LIVING (ADL): ICD-10-CM

## 2018-02-19 DIAGNOSIS — M25.531 RIGHT WRIST PAIN: ICD-10-CM

## 2018-02-19 DIAGNOSIS — R29.898 WEAKNESS OF RIGHT HAND: ICD-10-CM

## 2018-02-21 RX ORDER — INDOMETHACIN 25 MG/1
25 CAPSULE ORAL 2 TIMES DAILY WITH MEALS
Qty: 14 CAPSULE | OUTPATIENT
Start: 2018-02-21

## 2018-02-21 NOTE — TELEPHONE ENCOUNTER
Indomethacin previously was not covered by insurance, we sent in naproxen for her last week instead. Unclear why this is being requested.

## 2018-02-27 NOTE — TELEPHONE ENCOUNTER
Patient called back stated that she is only taking the naproxen, insurance will not cover lidocaine or the indomethacin. Please take indomethacin off medication list and D/C it

## 2018-02-27 NOTE — TELEPHONE ENCOUNTER
Called patient and left message regarding medication request asked patient to call me back directly

## 2018-03-05 ENCOUNTER — HOSPITAL ENCOUNTER (OUTPATIENT)
Dept: MAMMOGRAPHY | Facility: HOSPITAL | Age: 58
Discharge: HOME | End: 2018-03-05
Attending: HOSPITALIST

## 2018-03-05 DIAGNOSIS — Z12.31 VISIT FOR SCREENING MAMMOGRAM: ICD-10-CM

## 2018-03-07 ENCOUNTER — OFFICE VISIT - HEALTHEAST (OUTPATIENT)
Dept: OCCUPATIONAL THERAPY | Facility: REHABILITATION | Age: 58
End: 2018-03-07

## 2018-03-07 DIAGNOSIS — Z78.9 DECREASED ACTIVITIES OF DAILY LIVING (ADL): ICD-10-CM

## 2018-03-07 DIAGNOSIS — R29.898 WEAKNESS OF RIGHT HAND: ICD-10-CM

## 2018-03-07 DIAGNOSIS — M25.531 WRIST PAIN, RIGHT: ICD-10-CM

## 2018-03-12 ENCOUNTER — RECORDS - HEALTHEAST (OUTPATIENT)
Dept: ADMINISTRATIVE | Facility: OTHER | Age: 58
End: 2018-03-12

## 2018-03-13 LAB — MAMMOGRAM: NORMAL

## 2019-12-10 ENCOUNTER — OFFICE VISIT (OUTPATIENT)
Dept: FAMILY MEDICINE | Facility: CLINIC | Age: 59
End: 2019-12-10
Payer: MEDICARE

## 2019-12-10 VITALS
SYSTOLIC BLOOD PRESSURE: 125 MMHG | DIASTOLIC BLOOD PRESSURE: 77 MMHG | BODY MASS INDEX: 35.55 KG/M2 | WEIGHT: 208.2 LBS | RESPIRATION RATE: 16 BRPM | TEMPERATURE: 98.3 F | OXYGEN SATURATION: 99 % | HEIGHT: 64 IN | HEART RATE: 70 BPM

## 2019-12-10 DIAGNOSIS — J06.9 VIRAL URI WITH COUGH: Primary | ICD-10-CM

## 2019-12-10 PROBLEM — M71.9 DISORDER OF BURSAE AND TENDONS IN SHOULDER REGION: Status: ACTIVE | Noted: 2019-12-10

## 2019-12-10 PROBLEM — F41.1 ANXIETY STATE: Status: ACTIVE | Noted: 2019-12-10

## 2019-12-10 PROBLEM — M67.919 DISORDER OF BURSAE AND TENDONS IN SHOULDER REGION: Status: ACTIVE | Noted: 2019-12-10

## 2019-12-10 RX ORDER — PREDNISONE 20 MG/1
40 TABLET ORAL DAILY
Qty: 10 TABLET | Refills: 0 | Status: SHIPPED | OUTPATIENT
Start: 2019-12-10 | End: 2020-11-19

## 2019-12-10 RX ORDER — ATORVASTATIN CALCIUM 80 MG/1
80 TABLET, FILM COATED ORAL
COMMUNITY
End: 2020-12-09

## 2019-12-10 RX ORDER — BENZONATATE 100 MG/1
100 CAPSULE ORAL
COMMUNITY
Start: 2019-12-08 | End: 2020-11-19

## 2019-12-10 RX ORDER — CETIRIZINE HYDROCHLORIDE, PSEUDOEPHEDRINE HYDROCHLORIDE 5; 120 MG/1; MG/1
1 TABLET, FILM COATED, EXTENDED RELEASE ORAL
COMMUNITY
Start: 2019-12-08 | End: 2020-11-19

## 2019-12-10 ASSESSMENT — MIFFLIN-ST. JEOR: SCORE: 1486.45

## 2019-12-10 NOTE — PROGRESS NOTES
Preceptor Attestation:   Patient seen, evaluated and discussed with the resident. I have verified the content of the note, which accurately reflects my assessment of the patient and the plan of care.  Supervising Physician:Willis Morrison MD  [facility]

## 2019-12-10 NOTE — PATIENT INSTRUCTIONS

## 2019-12-10 NOTE — PROGRESS NOTES
Assessment and Plan     1. Viral URI with cough  ED notes reviewed from 2d ago. CXR clear. Again reiterated symptomatic cares and hand out provided. Given she does have some wheeze did elect to treat with steroids as below. She does likely have underlying lung disease given her significant smoking Hx, though has not smoked in 6 months and applauded this.   - predniSONE (DELTASONE) 20 MG tablet; Take 2 tablets (40 mg) by mouth daily  Dispense: 10 tablet; Refill: 0    Follow up for routine physical.   Options for treatment and follow-up care were reviewed with the patient and/or guardian. Bear Obregon and/or guardian engaged in the decision making process and verbalized understanding of the options discussed and agreed with the final plan.    Marlon Joyce MD   [facility]  Pager #: [phone]    Precepted today with: Dr. Morrison            HPI:       Bear Obregon is a 59 year old female who presents for ED follow up with Viral URI symptoms.     Was seen 12/8/19 (2d ago)  Diagnosed with viral URI - Notes reviewed  Negative CXR    She feels like things are the same  Having difficulty sleeping  She is using tessalon Perles   Using some pseudoephedrine as well    Symptoms started with sore throat  Then got cough  Was using Mucinex    Her worst symptom right now is her cough  Productive - green sputum  No blood  She quit smoking 6 months ago  Wheezing at times         PMHX:     Patient Active Problem List   Diagnosis     Hyperlipidemia     Tobacco use disorder     Obesity     Elevated blood pressure reading without diagnosis of hypertension     History of colonic polyps       Current Outpatient Medications   Medication Sig Dispense Refill     naproxen (NAPROSYN) 500 MG tablet Take 1 tablet (500 mg) by mouth 2 times daily (with meals) 180 tablet 1     omeprazole (PRILOSEC) 20 MG CR capsule Take 1 capsule (20 mg) by mouth daily 90 capsule 1     order for DME Equipment being ordered:  "Right wrist splint with thumb spica 1 Device 0     sertraline (ZOLOFT) 50 MG tablet Take 1 tablet (50 mg) by mouth daily 90 tablet 1       Social History     Tobacco Use     Smoking status: Former Smoker     Packs/day: 0.50     Years: 15.00     Pack years: 7.50     Types: Cigarettes     Last attempt to quit: 9/3/2006     Years since quittin.2     Smokeless tobacco: Never Used   Substance Use Topics     Alcohol use: No     Drug use: No            Allergies   Allergen Reactions     Sulfa Drugs Hives, Itching and Swelling     Hives and itching       No results found for this or any previous visit (from the past 24 hour(s)).         Review of Systems:   C: NEGATIVE for fatigue, unexpected change in weight  E: NEGATIVE for acute vision problems or changes  R: POSITIVE for significant cough no shortness of breath  CV: NEGATIVE for chest pain, palpitations or new or worsening peripheral edema  P: NEGATIVE for changes in mood or affect     Complete ROS negative unless noted above or in HPI       Physical Exam:     Vitals:    12/10/19 0847   BP: 125/77   Pulse: 70   Resp: 16   Temp: 98.3  F (36.8  C)   TempSrc: Oral   SpO2: 99%   Weight: 94.4 kg (208 lb 3.2 oz)   Height: 1.613 m (5' 3.5\")     Body mass index is 36.3 kg/m .    GENERAL APPEARANCE: alert and no acute distress  PSYCH: mentation appears normal and affect normal/bright  HEENT: Normal TMs bilaterally, no throat erythema  RESP: good air movement throughout, trace expiratory wheeze in lower lobes  CV: regular rate and rhythm, normal S1 S2, no S3 or S4 and no murmur, click or rub -  EXT: no cyanosis or edema in lower extremities  SKIN: no venous stasis changes      "

## 2020-11-19 ENCOUNTER — VIRTUAL VISIT (OUTPATIENT)
Dept: FAMILY MEDICINE | Facility: CLINIC | Age: 60
End: 2020-11-19
Payer: MEDICARE

## 2020-11-19 DIAGNOSIS — J01.01 ACUTE RECURRENT MAXILLARY SINUSITIS: Primary | ICD-10-CM

## 2020-11-19 DIAGNOSIS — F43.20 ADJUSTMENT DISORDER, UNSPECIFIED TYPE: ICD-10-CM

## 2020-11-19 PROCEDURE — 99442 PR PHYSICIAN TELEPHONE EVALUATION 11-20 MIN: CPT | Mod: 95 | Performed by: FAMILY MEDICINE

## 2020-11-19 NOTE — PROGRESS NOTES
"Family Medicine Telephone Visit Note         Telephone Visit Consent   Patient was verbally read the following and verbal consent was obtained.    \"Telephone visits are billed at different rates depending on your insurance coverage. During this emergency period, for some insurers they may be billed the same as an in-person visit.  Please reach out to your insurance provider with any questions.  If during the course of the call the physician/provider feels a telephone visit is not appropriate, you will not be charged for this service.\"    Name person giving consent:  Patient   Date verbal consent given:  11/19/2020  Time verbal consent given:  7:59 AM       Chief Complaint   Patient presents with     Pharyngitis     thinking might have sinus infection     Medication Reconciliation     Needs attention, nothing taking any meds now, wanted to wait until CPE to get refills            HPI   Patients name: Bear  Appointment start time:  8:08 AM    TELEPHONE VISIT      60-year-old woman being evaluated and managed by telephone due to COVID-19 pandemic.      Bear is a 60-year-old woman who has developed facial pain and pressure over the past few days.  She has had sinus infections in the past that feel like this.  She has tried the usual over-the-counter medications that she has used in the past and they have not been successful.      We reviewed her medication list.  She is not taking atorvastatin and has not been for some time.  She relates that she has become very anxious over the past few weeks and she was on sertraline which I think was probably helping control her anxiety in the past.  After discussion, we decided to restart the Zoloft but she will wait until her physical exam in December to decide on the atorvastatin.      We reviewed the COVID checklist.  She really does not have symptoms off that list that cannot be explained by a sinus infection.      We will start her on Augmentin 875 twice daily for 1 week.  " "Recheck if worsening course.  Otherwise, followup at her physical exam.      The patient involved in medical decision making throughout the visit.       COVID19 Checklist  Cough no  Shortness of breath or difficulty breathing no    Or at least two of the following:  Fever no  Chills no  Repeated shaking with chills no  Muscle pain or aches no  Headache yes  Sore throat no  New loss of taste or smell no      Current Outpatient Medications   Medication Sig Dispense Refill     amoxicillin-clavulanate (AUGMENTIN) 875-125 MG tablet Take 1 tablet by mouth 2 times daily for 7 days 14 tablet 0     omeprazole (PRILOSEC) 20 MG CR capsule Take 1 capsule (20 mg) by mouth daily 90 capsule 1     sertraline (ZOLOFT) 50 MG tablet Take 1 tablet (50 mg) by mouth daily 90 tablet 4     atorvastatin (LIPITOR) 80 MG tablet Take 80 mg by mouth       order for DME Equipment being ordered: Right wrist splint with thumb spica (Patient not taking: Reported on 11/19/2020) 1 Device 0     Allergies   Allergen Reactions     Hydrocodone-Acetaminophen Itching     Oxycodone Itching     Sulfa Drugs Hives, Itching and Swelling     Hives and itching              Review of Systems:     Constitutional, HEENT, cardiovascular, pulmonary, gi and gu systems are negative, except as otherwise noted.         Physical Exam:     There were no vitals taken for this visit.  Estimated body mass index is 36.3 kg/m  as calculated from the following:    Height as of 12/10/19: 1.613 m (5' 3.5\").    Weight as of 12/10/19: 94.4 kg (208 lb 3.2 oz).    Exam:  Constitutional: healthy, alert and no distress  Psychiatric: mentation appears normal and affect normal/bright  Stuffy and runny nose  Max sinus tender    Results from last visit:  Office Visit on 05/23/2017   Component Date Value Ref Range Status     WBC 05/23/2017 7.7  4.0 - 11.0 K/uL Final     RBC 05/23/2017 4.61  3.80 - 5.20 M/uL Final     Hemoglobin 05/23/2017 13.7  11.7 - 15.7 g/dL Final     Hematocrit 05/23/2017 " 43.5  35.0 - 47.0 % Final     MCV 05/23/2017 94.4  78.0 - 100.0 fL Final     MCH 05/23/2017 29.7  26.5 - 35.0 pg Final     MCHC 05/23/2017 31.5* 32.0 - 36.0 g/dL Final     Platelets 05/23/2017 238.0  150.0 - 450.0 K/uL Final     Glucose 05/23/2017 124.0* 60.0 - 109.0 mg/dL Final     Urea Nitrogen 05/23/2017 10.0  7.0 - 30.0 mg/dL Final     Creatinine 05/23/2017 0.7  0.6 - 1.3 mg/dL Final     Sodium 05/23/2017 142.0  133.0 - 144.0 mmol/L Final     Potassium 05/23/2017 3.9  3.4 - 5.3 mmol/L Final     Chloride 05/23/2017 105.0  94.0 - 109.0 mmol/L Final     Carbon Dioxide 05/23/2017 29.0  20.0 - 32.0 mmol/L Final     Calcium 05/23/2017 9.2  8.5 - 10.4 mg/dL Final     eGFR Calculated (Non Black Referen* 05/23/2017 >90  >60.0 mL/min Final     eGFR Calculated (Black Reference) 05/23/2017 >90  >60.0 mL/min Final           Assessment and Plan       ICD-10-CM    1. Acute recurrent maxillary sinusitis  J01.01 amoxicillin-clavulanate (AUGMENTIN) 875-125 MG tablet   2. Adjustment disorder, unspecified type  F43.20 sertraline (ZOLOFT) 50 MG tablet       Refilled medications that would be required in the next 3 months.     After Visit Information:  Will print and mail AVS     Return in about 19 days (around 12/8/2020) for Physical Exam.    Appointment end time: 8:25 AM  This is a telephone visit that took 17 minutes.      Clinician location:  [facility]     Leonidas Neal MD          \

## 2020-11-19 NOTE — PATIENT INSTRUCTIONS
You seem to have a sinus infection that we decided to treat with augmentin.  We also decided to restart your sertraline for anxiety.  We will decide on you atorvastatin at your physical on Dec 8 (already scheduled).  Call if worse or signs of Covid. You do not have the symptoms of covid now.

## 2020-12-08 ENCOUNTER — RECORDS - HEALTHEAST (OUTPATIENT)
Dept: ADMINISTRATIVE | Facility: OTHER | Age: 60
End: 2020-12-08

## 2020-12-08 ENCOUNTER — OFFICE VISIT (OUTPATIENT)
Dept: FAMILY MEDICINE | Facility: CLINIC | Age: 60
End: 2020-12-08
Payer: MEDICARE

## 2020-12-08 VITALS
TEMPERATURE: 98.2 F | BODY MASS INDEX: 38.5 KG/M2 | OXYGEN SATURATION: 98 % | SYSTOLIC BLOOD PRESSURE: 120 MMHG | DIASTOLIC BLOOD PRESSURE: 76 MMHG | RESPIRATION RATE: 18 BRPM | HEIGHT: 62 IN | HEART RATE: 58 BPM | WEIGHT: 209.2 LBS

## 2020-12-08 DIAGNOSIS — Z23 NEED FOR PROPHYLACTIC VACCINATION AND INOCULATION AGAINST INFLUENZA: ICD-10-CM

## 2020-12-08 DIAGNOSIS — Z87.891 PERSONAL HISTORY OF TOBACCO USE: ICD-10-CM

## 2020-12-08 DIAGNOSIS — Z00.00 ENCOUNTER FOR MEDICARE ANNUAL WELLNESS EXAM: Primary | ICD-10-CM

## 2020-12-08 DIAGNOSIS — Z00.00 WELLNESS EXAMINATION: Primary | ICD-10-CM

## 2020-12-08 LAB
CHOLEST SERPL-MCNC: 239 MG/DL
CHOLEST/HDLC SERPL: 5.7 RATIO
HBA1C MFR BLD: 5.8 % (ref 0–5.7)
HDLC SERPL-MCNC: 42 MG/DL
LDLC SERPL CALC-MCNC: 168 MG/DL (ref 0–99)
TRIGL SERPL-MCNC: 146 MG/DL
VLDL-CHOLESTEROL: 29 MG/DL (ref 7–32)

## 2020-12-08 PROCEDURE — G0296 VISIT TO DETERM LDCT ELIG: HCPCS | Mod: GC | Performed by: STUDENT IN AN ORGANIZED HEALTH CARE EDUCATION/TRAINING PROGRAM

## 2020-12-08 PROCEDURE — G0438 PPPS, INITIAL VISIT: HCPCS | Mod: GC | Performed by: STUDENT IN AN ORGANIZED HEALTH CARE EDUCATION/TRAINING PROGRAM

## 2020-12-08 PROCEDURE — 80061 LIPID PANEL: CPT | Performed by: STUDENT IN AN ORGANIZED HEALTH CARE EDUCATION/TRAINING PROGRAM

## 2020-12-08 PROCEDURE — 90686 IIV4 VACC NO PRSV 0.5 ML IM: CPT | Performed by: STUDENT IN AN ORGANIZED HEALTH CARE EDUCATION/TRAINING PROGRAM

## 2020-12-08 PROCEDURE — G0008 ADMIN INFLUENZA VIRUS VAC: HCPCS | Performed by: STUDENT IN AN ORGANIZED HEALTH CARE EDUCATION/TRAINING PROGRAM

## 2020-12-08 PROCEDURE — 36415 COLL VENOUS BLD VENIPUNCTURE: CPT | Performed by: STUDENT IN AN ORGANIZED HEALTH CARE EDUCATION/TRAINING PROGRAM

## 2020-12-08 PROCEDURE — 99207 PR NO BILLABLE SERVICE THIS VISIT: CPT

## 2020-12-08 ASSESSMENT — ANXIETY QUESTIONNAIRES
7. FEELING AFRAID AS IF SOMETHING AWFUL MIGHT HAPPEN: MORE THAN HALF THE DAYS
2. NOT BEING ABLE TO STOP OR CONTROL WORRYING: NEARLY EVERY DAY
5. BEING SO RESTLESS THAT IT IS HARD TO SIT STILL: SEVERAL DAYS
3. WORRYING TOO MUCH ABOUT DIFFERENT THINGS: NEARLY EVERY DAY
1. FEELING NERVOUS, ANXIOUS, OR ON EDGE: MORE THAN HALF THE DAYS

## 2020-12-08 ASSESSMENT — PATIENT HEALTH QUESTIONNAIRE - PHQ9
5. POOR APPETITE OR OVEREATING: MORE THAN HALF THE DAYS
SUM OF ALL RESPONSES TO PHQ QUESTIONS 1-9: 13

## 2020-12-08 ASSESSMENT — MIFFLIN-ST. JEOR: SCORE: 1465.42

## 2020-12-08 NOTE — PROGRESS NOTES
Medicare Annual Wellness Visit         HPI     This 60 year old female presents as an established patient  Michelle Armstrong who presents for an initial Medicare Wellness Exam.    JORGE LUIS done years ago before , no pap indicated    Smoked for 1/2 ppd 15 years, 150 pack year history    Patient Active Problem List   Diagnosis     Hyperlipidemia     Tobacco use disorder     Obesity     Elevated blood pressure reading without diagnosis of hypertension     History of colonic polyps     Anxiety state     Disorder of bursae and tendons in shoulder region       Past Medical History:   Diagnosis Date     Depressive disorder, not elsewhere classified     divorce, no meds     Disorders of bursae and tendons in shoulder region, unspecified      Disorders of bursae and tendons in shoulder region, unspecified      Other and unspecified hyperlipidemia 2007     Personal history of urinary calculi      Superficial injury of cornea     left     Tobacco use disorder     quit        Family History   Problem Relation Age of Onset     Hypertension Mother      Hypertension Brother      Diabetes Brother      Hypertension Sister      Asthma No family hx of      C.A.D. No family hx of      Cerebrovascular Disease No family hx of      Breast Cancer No family hx of      Cancer - colorectal No family hx of      Prostate Cancer No family hx of          Past Surgical History:   Procedure Laterality Date     C APPENDECTOMY       C  DELIVERY ONLY      x 3     EXC SKIN BENIG 0.6-1CM FACE,FACIAL      benign facial tumor removed,left     HYSTERECTOMY, JORGE LUIS      Non-cancerous reasons.  Uterine adhesions.     ROTATOR CUFF REPAIR RT/LT      Bilateral     SALPINGO OOPHORECTOMY,R/L/RAUL      Bilateral       Reviewed no other significant FH    Family History and past Medical History reviewed and it is unchanged/updated.       Review of Systems   Constitutional:   fevers, night sweats or unintentional weight change ?  NO      Eyes:    vision change, diplopia or red eyes?  Yes, bad glasses     Ears, Nose, Mouth, Throat:   tinnitus or hearing change,  epistaxis or nasal discharge,  oral lesions, throat pain ?  NO      Neck:   stiffness?  NO           Cardiovascular:   chest pain, palpitations, or pain with walking, orthopnea or PND?  NO   Breasts:  Any bumps or unusual discharge?     NO         Respiratory:   dyspnea, cough, shortness of breath or wheezing?  NO         GI:   nausea, vomiting, diarrhea or constipation,  abdominal pain ?  NO         :   change in urine,  dysuria or hematuria,  sexual dysfunction ?  NO        Musculoskeletal:   joint or muscle pain or swelling?  Yes, bilateral knee stiffness, soaks in epsom salt at night that helps. Left hand numbness in left ulnar area.          Skin:   concerning lesions or moles?  Yes lump at right flank region that has decreased in size, was initially sore and improved           Nervous System:   loss of strength or sensation,  numbness or tingling,  tremor,  dizziness,  headache?  Yes, anxiety on Zoloft  Endocrine/Homone:   polyuria or polydipsia,  temperature intolerance?  NO            Blood and Lymphnodes:   concerning bumps,  bleeding problems?  NO            Allergy:   environmental allergies?  NO            Mental Health:   depression or anxiety,  sleep problems?  NO               Medical Care     Have you been to an ER or a hospital in the last year? Yes for BPPV  What other specialists or organizations are involved in your medical care?  None  Current providers sharing in care for this patient include:  Patient Care Team:  Michelle Armstrong MD as PCP - General  Michelle Armstrong MD as Assigned PCP         Social History     Social History     Tobacco Use     Smoking status: Former Smoker     Packs/day: 0.50     Years: 15.00     Pack years: 7.50     Types: Cigarettes     Quit date: 9/3/2006     Years since quittin.2     Smokeless tobacco: Never Used   Substance Use Topics     Alcohol use:  No     Marital Status:  Who lives in your household? Alone  Does your home have any of the following safety concerns? Loose rugs in the hallway, no grab bars in the bathroom, no handrails on the stairs or have poorly lit areas?  No  Do you feel threatened or controlled by a partner, ex-partner or anyone in your life? No  Has anyone hurt you physically, for example by pushing, hitting, slapping or kicking you   or forcing you to have sex? No  Do you need help with the phone, transportation, shopping, preparing meals, housework, laundry, medications or managing money? No   Have you noticed any hearing difficulties? No      Risk Behaviors and Healthy Habits     How many servings of fruits and vegetables do you eat a day? 3 servings a day on average. Reviewed and encourage increase intake as able to fulfill daily recommendation of 5 servings a day of fruits and vegetables.   How often do you exercise and what do you do? walking daily x 7 days of the week x 1 hour   Do you frequently ride without a seatbelt? No  Do you use tobacco?  No - former, quit 2006  Do you use any other drugs? No         Do you use alcohol?No    Today's PHQ-2 Score: (!) 3    Sexual Health     Are you sexually active?  No   Have you had any sexually transmitted infections? No   Any sexual concerns? No     FOR WOMEN  What year did you stop having periods? None since JORGE LUIS when she was in her 30's  Any vaginal bleeding in the last year? No  Have you ever had an abnormal Pap smear? No    FUNCTIONAL ABILITY/SAFETY SCREENING     Fall Risk Assessment Today: Fallen 2 or more times in the past year?: No  Any fall with injury in the past year?: No  Timed Up and Go Test (>13.5 is fall risk; contact physician) : 13    Hearing evaluation if done: No    EVALUATION OF COGNITIVE FUNCTION     Mood/affect:Normal  Appearance:Normal  Family member/caregiver input: Normal    Mini Cog Scoring   2 points  Clock Draw Test result:  Normal    SCREENING FOR PREVENTION  "and EARLY DETECTION     Screening Lipid Level (covered every 5 years ): Recommended and patient accepted testing.  Breast CA Screenin-2 years 50-74years:  Recommended and patient accepted testing. and Diabetes Screening : FBG covered if at risk (obesity, HTN, dyslipidemia, FH): Recommended and patient accepted testing.    CV Risk based on Pooled Cohort Risk:  The 10-year ASCVD risk score (Sloan CORDOVA Jr., et al., 2013) is: 6.9%    Values used to calculate the score:      Age: 60 years      Sex: Female      Is Non- : Yes      Diabetic: No      Tobacco smoker: No      Systolic Blood Pressure: 120 mmHg      Is BP treated: No      HDL Cholesterol: 42 mg/dL      Total Cholesterol: 239 mg/dL    Advanced Directives: Discussed and patient desires to to have further information and discuss with family.      Immunization History   Administered Date(s) Administered     Influenza (IIV3) PF 11/10/2014     Influenza Vaccine IM > 6 months Valent IIV4 2020     Pneumococcal 23 valent 2011     TD (ADULT, 7+) 1996     TDAP Vaccine (Adacel) 2007, 2009       Reviewed Immunization Record Today  Pneumoccocal Vaccine: Yes  Varicella Vaccine: Available at local pharmacy  TDaP:No         Physical Exam     Vitals: /76 (BP Location: Right arm)   Pulse 58   Temp 98.2  F (36.8  C) (Oral)   Resp 18   Ht 1.58 m (5' 2.21\")   Wt 94.9 kg (209 lb 3.2 oz)   SpO2 98%   BMI 38.01 kg/m    BMI= Body mass index is 38.01 kg/m .  GENERAL APPEARANCE: healthy, alert and no distress  EYES: Eyes grossly normal to inspection, PERRL and conjunctivae and sclerae normal  HENT: ear canals and TM's normal, nose and mouth without ulcers or lesions, oropharynx clear and oral mucous membranes moist  NECK: no adenopathy, no asymmetry, masses, or scars and thyroid normal to palpation  RESP: lungs clear to auscultation - no rales, rhonchi or wheezes  BREAST: patient declined  CV: regular rate and rhythm, " normal S1 S2, no S3 or S4, no murmur, click or rub, no peripheral edema and peripheral pulses strong  ABDOMEN: soft, nontender, no hepatosplenomegaly, no masses and bowel sounds normal  MS: no musculoskeletal defects are noted and gait is age appropriate without ataxia  SKIN: no suspicious lesions or rashes  NEURO: Normal strength and tone, sensory exam grossly normal, mentation intact and speech normal  PSYCH: mentation appears normal and affect normal/bright        Assessment and Plan   Initial Medicare Wellness Exam  1. Health Care Maintenance: Normal Physical Exam    PLAN:  Bear was seen today for wellness visit, medication reconciliation and imm/inj.    Diagnoses and all orders for this visit:    Encounter for Medicare annual wellness exam  -     Prof Fee: Shared Decision Making Visit for Lung Cancer Screening  -     CT Chest Lung Cancer Scrn Low Dose wo; Future  -     Lipid Panel ([facility]) - Results < 1 hr  -     Glycosylated Hgb A1C ([facility])  -     Cancel: Hepatitis C Screen Reflex to HCV RNA Quant and Genotype  -     MA SCREENING DIGITAL BILAT; Future  -     atorvastatin (LIPITOR) 80 MG tablet; Take 1 tablet (80 mg) by mouth daily    Personal history of tobacco use  -     Hepatits C RNA by RT-PCR ([facility])    Need for prophylactic vaccination and inoculation against influenza  -     Cancel: INFLUENZA QUAD, RECOMBINANT, P-FREE (RIV4) (FLUBLOCK) [47420]  -     INFLUENZA VACCINE IM > 6 MONTHS VALENT IIV4 [32412]        Lung Cancer Screening Shared Decision Making Visit     Bear Obregon is eligible for lung cancer screening on the basis of the information provided in my signed lung cancer screening order.     I have discussed with patient the risks and benefits of screening for lung cancer with low-dose CT.     The risks include:  radiation exposure: one low dose chest CT has as much ionizing radiation as about 15 chest x-rays or 6 months of background radiation living in [place]    false  positives: 96% of positive findings/nodules are NOT cancer, but some might still require additional diagnostic evaluation, including biopsy  over-diagnosis: some slow growing cancers that might never have been clinically significant will be detected and treated unnecessarily     The benefit of early detection of lung cancer is contingent upon adherence to annual screening or more frequent follow up if indicated.     Furthermore, reaping the benefits of screening requires Zernia R Sabas to be willing and physically able to undergo diagnostic procedures, if indicated. Although no specific guide is available for determining severity of comorbidities, it is reasonable to withhold screening in patients who have greater mortality risk from other diseases.     We did discuss that the only way to prevent lung cancer is to not smoke. Smoking cessation counseling was not given.      Options for treatment and follow-up care were reviewed with the patient and/or guardian. Pt and/or guardian engaged in the decision making process and verbalized understanding of the options discussed and agreed with the final plan.    Precepted today with: MD Michelle Howard MD  [facility] Family Medicine Resident PGY-2  [facility]  Pager: [phone]

## 2020-12-08 NOTE — PROGRESS NOTES
Medicare Wellness Visit  Health Risk Assessment           Health Risk Assessment / Review of Systems     Constitutional: Any fevers or night sweats? No     Eyes:  Vision problems   No, had reader glasses. Last eye exam about 4 years ago.    Hearing Do you feel you have hearing loss?  No     Cardiovascular: Any chest pain, fast or irregular heart beat, calf pain with walking?     No           Respiratory:   Any breathing problems or cough?   No     Gastrointestinal: Any stomach or stool problems?   No      Genitourinary: Do you have difficulty controlling urination?   No     Muscles and Joints: Any joint stiffness or soreness?    YES -hx scope of left knee, continues to have stiffness in bilateral knees. Soak in epsom salt at night which has helped soothe. Also has had left hand, 5th finger numbness constantly.     Skin: Any concerning lesions or moles?   No, of note has noticed on right side of posterior trunk a sore lump. This has decreased in size and has improved.    Nervous System: Any loss of strength or feeling, numbness or tingling, shaking, dizziness, or headache?   NO, dizziness in May 2020 was seen in ED, dx vertigo. This has resolved.    Mental Health: Any depression, anxiety or problems sleeping?    No     Cognition: Do you have any problems with your memory?  No            Medical Care     What other specialists or organizations are involved in your medical care?  none  Patient Care Team       Relationship Specialty Notifications Start End    Michelle Armstrong MD PCP - General   7/1/20     Phone: [phone]          [facility], [facility], [address] [place] [address]    Michelle Armstrong MD Assigned PCP   7/5/20     Phone: [phone]          [facility], [facility], [address] [place] [id number]          Have you been  to the ER or overnight in the hospital in the last year?   YES -5/2020         Social History / Home Safety       Marital Status:  Who lives in your household? self    Do you feel threatened or controlled by a partner, ex-partner or anyone in your life? No     Has anyone hurt you physically, for example by pushing, hitting, slapping or kicking you   or forcing you to have sex? No          Does your home have any of the following safety concerns; loose rugs in the hallway,  bathrooms with no grab bars by the tub or toilet, stairs with no handrails or poorly lit areas?  No     Do you need help with dressing yourself, bathing, eating or getting around your home?  No     Do you need help with the phone, transportation, shopping, preparing meals, housework, laundry, medications or managing money?No       Risk Behaviors and Healthy Habits     History   Smoking Status     Former Smoker     Packs/day: 0.50     Years: 15.00     Types: Cigarettes     Quit date: 9/3/2006   Smokeless Tobacco     Never Used     How many servings of fruits and vegetables do you eat a day? 3 servings a day on average. Reviewed and encourage increase intake as able to fulfill daily recommendation of 5 servings a day of fruits and vegetables.     Exercise: walking daily x 7 days of the week x 1 hour     Do you frequently drive without a seatbelt? No     Do you use tobacco?  No - former, quit 2006    Do you use any other drugs? No         Do you use alcohol?No      Frailty Assessment            Have you lost 10 or more pounds unintentionally in the previous year? No    How difficult is walking from one room to the other on the same level?mildly       Is it difficult to lift or carry something as heavy as 10 pounds?YES -moderate, with left hand numbness makes it difficult to support items. Dominant right hand.      Compared with most (men/women) your age, would you say  that you are more active, less active, or about the same? more        FALL  RISK ASSESSMENT 12/8/2020   Fallen 2 or more times in the past year? No   Any fall with injury in the past year? No   Timed Up and Go Test/Seconds (13.5 is a fall risk; contact physician) 13         Advance Directives:   Discussed with patient and family as appropriate. Has patient  completed advance directives and/or a living will? No declined blank copy of an advance directive at this time.    Milana Puente RN

## 2020-12-08 NOTE — PROGRESS NOTES
Preceptor Attestation:   Patient seen, evaluated and discussed with the resident. I have verified the content of the note, which accurately reflects my assessment of the patient and the plan of care.  Supervising Physician:Digna Parnell MD  [facility]

## 2020-12-08 NOTE — PATIENT INSTRUCTIONS
PERSONAL PREVENTIVE SERVICES PLAN - SERVICES     Review these tests with your medical staff then decide which ones you want and take this page home for your reference      SCREENING TESTS     Description   Year of Last Screening   Recommended Today?   Heart disease screening blood tests    Cholesterol level Reducing cholesterol can reduce your risk of heart attacks by 25%.  Screening is recommended yearly if you are at risk of heart disease otherwise every 4-5 years 2006 Yes; Recommended.   Diabetes screening tests    Hemoglobin A1c blood test   Finding and treating diabetes early can reduce complications.  Screening recommended/covered yearly if you have high blood pressure, high cholesterol, obesity (BMI >30), or a history of high blood glucose tests; or 2 of the following: family history of diabetes, overweight (BMI >25 but <30), age 65 years or older, and a history of diabetes of pregnancy or gave birth to baby weighing more than 9 lbs.  Yes; Recommended.   Hepatitis B screening Finding hepatitis B early can reduce complications.  Screening is recommended for persons with selected risk factors.  No: is not indicated today.   Hepatitis C screening Finding hepatitis C early can reduce complications.  Screening is recommended for all persons born from 1945 through 1965 and for those with selected other risk factors.   Yes; Recommended.   HIV screening Finding HIV early can reduce complications.  Screening is recommended for persons with risk factors for HIV infection.  No: is not indicated today.   Glaucoma screening Early detection of glaucoma can prevent blindness.   Please talk to your eye doctor about this.       SCREENING TESTS     Description   Year of Last Screening   Recommended Today?   Colorectal cancer screening    Fecal occult blood test     Screening colonoscopy Screening for colon cancer has been shown to reduce death from colon cancer by 25-30%. Screening recommended to start at 50 years and  continuing until age 75 years.   2/15/2018 polyps of colon, diverticulosis No: is not indicated today.   Breast Cancer Screening (women)    Mammogram Mammogram screening for breast cancer has been shown to reduce the risk of dying from breast cancer and prolong life. Screening is recommended every 1-2 years for women aged 50 to 74 years.  3/13/2018  negative Yes; Recommended   Cervical Cancer screening (women)    Pap Cervical pap smears can reduce cervical cancer. Screening is recommended annually if high risk (history of abnormal pap smears) otherwise every 2-3 years, stop screening at 65 years of age if history of normal paps.  Yes; Recommended.   Screening for Osteoporosis:  Bone mass measurements (women)    Dexa Scan Screening and treating Osteoporosis can reduce the risk of hip and spine fractures. Screening is recommended in women 65 years or older and in women and men at risk of osteoporosis.  No: is not indicated today.   Screening for Lung Cancer     Low-dose CT scanning Screening can reduce mortality in persons aged 55-80 who have smoked at least 30 pack-years and who are either still smoking or have quit in the past 15 years.  Yes; Recommended.   Abdominal Aortic Aneurysm (AAA) screening    Ultrasound (US)   An aneurysm treated before rupture is very safe -a ruptured aneurysm can be fatal.  Screening  by US for AAA is limited to patients who meet one of the following criteria:    Men who are 65-75 years old and have smoked more than 100 cigarettes in their lifetime    Anyone with a family history of abdominal aortic aneurysm  No: is not indicated today.     Here are your recommended immunizations.  Take this home for your reference.                                                    IMMUNIZATIONS Description Recommend today?     Influenza (Flu shot) Prevents flu; should get every year Yes; Recommended and ordered.   PCV 13 Pneumonia vaccination; you get it once No; not indicated today   PPSV 23 Second  pneumonia vaccination; usually get it 1 year after PCV 13 No; not indicated today.   Zoster (Shingles) Prevents shingles; you get it once  (Check with Part D insurance for coverage, must receive at a pharmacy, not clinic) No: is not indicated today.   Tetanus Prevents tetanus; once every 10 years No; is up to date.     Hepatitis B  If you have any of the following risk factors you should be immunized for hepatitis B: severe kidney disease, people who live in the same house as a carrier of Hepatitis B virus, people who live in  institutions (e.g. nursing homes or group homes), homosexual men, patients with hemophilia who received Factor VIII or IX concentrates, abusers of illicit injectable drugs No: is not indicated today.      PATIENT INSTRUCTIONS    Yearly exam:     See your health care provider every year in order to review changes in your health, review medicines that you take, and discuss preventive care needs such as immunizations and cancer screening.    Get a flu shot each year.     Advance Directives:    If you have not done so, you are encouraged to complete advance directives and/or a living will.   More information about advance directives can be found at: http://www.mnmed.org/advocacy/Key-Issues/Advance-Directives    Nutrition:     Eat at least 5 servings of fruits and vegetables each day.     Eat whole-grain bread, whole-wheat pasta and brown rice instead of white grains and rice.     Talk to your doctor about Calcium and Vitamin D.     Lifestyle:    Exercise for at least 150 minutes a week (30 minutes a day, 5 days a week). This will help you control your weight and prevent disease.     Limit alcohol to one drink per day.     If you smoke, try to quit - your doctor will be happy to help.     Wear sunscreen to prevent skin cancer.     See your dentist every six months for an exam and cleaning.     See your eye doctor every 1 to 2 years to screen for conditions such as glaucoma, macular degeneration and  cataracts.                              Personalized Prevention Plan  You are due for the preventive services outlined below.  Your care team is available to assist you in scheduling these services.  If you have already completed any of these items, please share that information with your care team to update in your medical record.  Health Maintenance Due   Topic Date Due     Discuss Advance Care Planning  12/22/1959     HIV Screening  12/22/1972     Hepatitis C Screening  12/22/1975     Zoster (Shingles) Vaccine (1 of 2) 12/22/2007     Cholesterol Lab  07/31/2013     Diptheria Tetanus Pertussis (DTAP/TDAP/TD) Vaccine (4 - Td) 12/08/2019     Mammogram  03/13/2020     Flu Vaccine (1) 09/01/2020     Personalized Prevention Plan  You are due for the preventive services outlined below.  Your care team is available to assist you in scheduling these services.  If you have already completed any of these items, please share that information with your care team to update in your medical record.  Health Maintenance Due   Topic Date Due     Discuss Advance Care Planning  12/22/1959     HIV Screening  12/22/1972     Hepatitis C Screening  12/22/1975     Zoster (Shingles) Vaccine (1 of 2) 12/22/2007     Cholesterol Lab  07/31/2013     Diptheria Tetanus Pertussis (DTAP/TDAP/TD) Vaccine (4 - Td) 12/08/2019     Mammogram  03/13/2020     Flu Vaccine (1) 09/01/2020     Lung Cancer Screening   Frequently Asked Questions  If you are at high-risk for lung cancer, getting screened with low-dose computed tomography (LDCT) every year can help save your life. This handout offers answers to some of the most common questions about lung cancer screening. If you have other questions, please call [phone] ([phone]).     What is it?  Lung cancer screening uses special X-ray technology to create an image of your lung tissue. The exam is quick and easy and takes less than 10 seconds. We don t give you any medicine or use any  needles. You can eat before and after the exam. You don t need to change your clothes as long as the clothing on your chest doesn t contain metal. But, you do need to be able to hold your breath for at least 6 seconds during the exam.    What is the goal of lung cancer screening?  The goal of lung cancer screening is to save lives. Many times, lung cancer is not found until a person starts having physical symptoms. Lung cancer screening can help detect lung cancer in the earliest stages when it may be easier to treat.    Who should be screened for lung cancer?  We suggest lung cancer screening for anyone who is at high-risk for lung cancer. You are in the high-risk group if you:      are between the ages of 55 and 79, and    have smoked at least 1 pack of cigarettes a day for 30 or more years, and    still smoke or have quit within the past 15 years.    However, if you have a new cough or shortness of breath, you should talk to your doctor before being screened.    Some national lung health advocacy groups also recommend screening for people ages 50 to 79 who have smoked an average of 1 pack of cigarettes a day for 20 years. They must also have at least 1 other risk factor for lung cancer, not including exposure to secondhand smoke. Other risk factors are having had cancer in the past, emphysema, pulmonary fibrosis, COPD, a family history of lung cancer, or exposure to certain materials such as arsenic, asbestos, beryllium, cadmium, chromium, diesel fumes, nickel, radon or silica. Your care team can help you know if you have one of these risk factors.     Why does it matter if I have symptoms?  Certain symptoms can be a sign that you have a condition in your lungs that should be checked and treated by your doctor. These symptoms include fever, chest pain, a new or changing cough, shortness of breath that you have never felt before, coughing up blood or unexplained weight loss. Having any of these symptoms can  greatly affect the results of lung cancer screening.       Should all smokers get an LDCT lung cancer screening exam?  It depends. Lung cancer screening is for a very specific group of men and women who have a history of heavy smoking over a long period of time (see  Who should be screened for lung cancer  above).  I am in the high-risk group, but have been diagnosed with cancer in the past. Is LDCT lung cancer screening right for me?  In some cases, you should not have LDCT lung screening, such as when your doctor is already following your cancer with CT scan studies. Your doctor will help you decide if LDCT lung screening is right for you.  Do I need to have a screening exam every year?  Yes. If you are in the high-risk group described earlier, you should get an LDCT lung cancer screening exam every year until you are 79, or are no longer willing or able to undergo screening and possible procedures to diagnose and treat lung cancer.  How effective is LDCT at preventing death from lung cancer?  Studies have shown that LDCT lung cancer screening can lower the risk of death from lung cancer by 20 percent in people who are at high-risk.  What are the risks?  There are some risks and limitations of LDCT lung cancer screening. We want to make sure you understand the risks and benefits, so please let us know if you have any questions. Your doctor may want to talk with you more about these risks.    Radiation exposure: As with any exam that uses radiation, there is a very small increased risk of cancer. The amount of radiation in LDCT is small--about the same amount a person would get from a mammogram. Your doctor orders the exam when he or she feels the potential benefits outweigh the risks.    False negatives: No test is perfect, including LDCT. It is possible that you may have a medical condition, including lung cancer, that is not found during your exam. This is called a false negative result.    False positives and  more testing: LDCT very often finds something in the lung that could be cancer, but in fact is not. This is called a false positive result. False positive tests often cause anxiety. To make sure these findings are not cancer, you may need to have more tests. These tests will be done only if you give us permission. Sometimes patients need a treatment that can have side effects, such as a biopsy. For more information on false positives, see  What can I expect from the results?     Findings not related to lung cancer: Your LDCT exam also takes pictures of areas of your body next to your lungs. In a very small number of cases, the CT scan will show an abnormal finding in one of these areas, such as your kidneys, adrenal glands, liver or thyroid. This finding may not be serious, but you may need more tests. Your doctor can help you decide what other tests you may need, if any.  What can I expect from the results?  About 1 out of 4 LDCT exams will find something that may need more tests. Most of the time, these findings are lung nodules. Lung nodules are very small collections of tissue in the lung. These nodules are very common, and the vast majority--more than 97 percent--are not cancer (benign). Most are normal lymph nodes or small areas of scarring from past infections.  But, if a small lung nodule is found to be cancer, the cancer can be cured more than 90 percent of the time. To know if the nodule is cancer, we may need to get more images before your next yearly screening exam. If the nodule has suspicious features (for example, it is large, has an odd shape or grows over time), we will refer you to a specialist for further testing.  Will my doctor also get the results?  Yes. Your doctor will get a copy of your results.  Is it okay to keep smoking now that there s a cancer screening exam?  No. Tobacco is one of the strongest cancer-causing agents. It causes not only lung cancer, but other cancers and cardiovascular  (heart) diseases as well. The damage caused by smoking builds over time. This means that the longer you smoke, the higher your risk of disease. While it is never too late to quit, the sooner you quit, the better.  Where can I find help to quit smoking?  The best way to prevent lung cancer is to stop smoking. If you have already quit smoking, congratulations and keep it up! For help on quitting smoking, please call [employer] at 3-127-154-DQRH ([phone]) or the American Cancer Society at 1-782.440.1402 to find local resources near you.  One-on-one health coaching:  If you d prefer to work individually with a health care provider on tobacco cessation, we offer:      Medication Therapy Management:  Our specially trained pharmacists work closely with you and your doctor to help you quit smoking.  Call [phone] or [phone] (toll free).     Can Do: Health coaching offered by [facility].  [email or web address]        Referral for :     Mammogram    LOCATION/PLACE/Provider :    [facility]   DATE & TIME :   Dec. 23rd at 4:30pm   PHONE :     [phone]  FAX :    [phone]  Appointment instructions: No deodorant, lotion or perfume to appointment   Appointment made by clinic staff/:    Bharati    Referral for :     CT chest lung cancer screening   LOCATION/PLACE/Provider :    [facility]   DATE & TIME :    Dec. 22nd at 4:25pm   PHONE :     [phone]  FAX :    [phone]  Appointment made by clinic staff/:    Bharati

## 2020-12-09 ENCOUNTER — RECORDS - HEALTHEAST (OUTPATIENT)
Dept: ADMINISTRATIVE | Facility: OTHER | Age: 60
End: 2020-12-09

## 2020-12-09 LAB — HCV AB SER QL: NEGATIVE

## 2020-12-09 RX ORDER — ATORVASTATIN CALCIUM 80 MG/1
80 TABLET, FILM COATED ORAL DAILY
Qty: 90 TABLET | Refills: 0 | Status: SHIPPED | OUTPATIENT
Start: 2020-12-09 | End: 2021-02-19

## 2020-12-14 ENCOUNTER — ANCILLARY PROCEDURE (OUTPATIENT)
Dept: GENERAL RADIOLOGY | Facility: CLINIC | Age: 60
End: 2020-12-14
Attending: FAMILY MEDICINE
Payer: MEDICARE

## 2020-12-14 ENCOUNTER — OFFICE VISIT (OUTPATIENT)
Dept: FAMILY MEDICINE | Facility: CLINIC | Age: 60
End: 2020-12-14
Payer: MEDICARE

## 2020-12-14 VITALS
RESPIRATION RATE: 18 BRPM | HEART RATE: 66 BPM | DIASTOLIC BLOOD PRESSURE: 101 MMHG | OXYGEN SATURATION: 98 % | SYSTOLIC BLOOD PRESSURE: 137 MMHG | TEMPERATURE: 98.4 F

## 2020-12-14 DIAGNOSIS — B37.2 INTERTRIGINOUS CANDIDIASIS: ICD-10-CM

## 2020-12-14 DIAGNOSIS — G56.23 ENTRAPMENT OF BOTH ULNAR NERVES: ICD-10-CM

## 2020-12-14 DIAGNOSIS — M25.561 ARTHRALGIA OF RIGHT KNEE: ICD-10-CM

## 2020-12-14 DIAGNOSIS — M25.561 ARTHRALGIA OF RIGHT KNEE: Primary | ICD-10-CM

## 2020-12-14 PROCEDURE — 73560 X-RAY EXAM OF KNEE 1 OR 2: CPT | Mod: RT | Performed by: RADIOLOGY

## 2020-12-14 PROCEDURE — 99214 OFFICE O/P EST MOD 30 MIN: CPT | Mod: GC | Performed by: STUDENT IN AN ORGANIZED HEALTH CARE EDUCATION/TRAINING PROGRAM

## 2020-12-14 RX ORDER — NAPROXEN 500 MG/1
500 TABLET ORAL 2 TIMES DAILY WITH MEALS
Qty: 60 TABLET | Refills: 0 | Status: SHIPPED | OUTPATIENT
Start: 2020-12-14 | End: 2021-01-13

## 2020-12-14 NOTE — PATIENT INSTRUCTIONS
Referral for :     Hand therapy    LOCATION/PLACE/Provider :    [facility]   DATE & TIME :    Referral faxed, location will call   PHONE :     [phone]  FAX :    [phone]  Appointment made by clinic staff/:    Bharati

## 2020-12-14 NOTE — PROGRESS NOTES
Preceptor Attestation:   Patient seen, evaluated and discussed with the resident. I have verified the content of the note, which accurately reflects my assessment of the patient and the plan of care.   Supervising Physician:  Nikita Taylor MD

## 2020-12-14 NOTE — PROGRESS NOTES
HPI:       Bear Obregon is a 60 year old  female with PMH of carpal tunnel, osteoarthritis, and obesity who presents for the following concerns:    Hand numbness:  - Onset 6 months ago   - Worsening since that time with increased frequency   - Currently describes constant numbness worst on left fifth finger. Also involves third and fourth fingers. Both palmar and dorsal sides. Travels to wrist, but not past wrist.    - Also endorses intermittent tingling and weakness of left hand. Occasionally drops things.    - No repetitive movements that she can identify.   - Tried ibuprofen. No significant relief.   - Has not tried braces. Tried brace with right hand in the past and felt it was helpful but it does not fit the left hand.   - History of carpal tunnel on right hand s/p surgery. States that current symptoms feel the same.     Knee pain:  - Right knee pain ongoing for few months. .  - Across upper knee, bilaterally  - Feels like it is worse at night after activity. Has been interrupting sleep and needing to use melatonin.  - No injury, twisting/tearing, popping that she can remember.   - Has tried topical muscle, IcyHot, Tylenol/ibuprofen, Aleve without relief. Has tried Epsom salts, which she feels helps. Also has tried pillow at night.   - Feels like her pain limits her ability to walk. Does not use walker or cane.   - Left knee has known osteoarthritis via imaging. Had injections and scope performed by Orthopedics..   - Has never had imaging on right knee.     Rash:  - Rash under bilateral breasts   - Present for the past week   - Itchy   - Has tried Vaseline without relief.   - Has had similar rash during the summer time. Was told it a yeast infection. Used cream with relief.     Results Review:  - Elevated cholesterol and pre-diabetic A1c on Wellness Check   - Advised to restart Atorvastatin after last visit. States she has not picked up medication yet   - Has been off statin for a long time per report    - Discussed diet. Feels like she was able to control her diet well prior to pandemic, but she started drinking soda and eating sweets again. Tries to eat multi grain carbohydrates. Eats a lot of fruits.          PMHX:     Patient Active Problem List   Diagnosis     Hyperlipidemia     Tobacco use disorder     Obesity     Elevated blood pressure reading without diagnosis of hypertension     History of colonic polyps     Anxiety state     Disorder of bursae and tendons in shoulder region       Current Outpatient Medications   Medication Sig Dispense Refill     sertraline (ZOLOFT) 50 MG tablet Take 1 tablet (50 mg) by mouth daily 90 tablet 4     atorvastatin (LIPITOR) 80 MG tablet Take 1 tablet (80 mg) by mouth daily (Patient not taking: Reported on 2020) 90 tablet 0     omeprazole (PRILOSEC) 20 MG CR capsule Take 1 capsule (20 mg) by mouth daily (Patient not taking: Reported on 2020) 90 capsule 1     order for DME Equipment being ordered: Right wrist splint with thumb spica (Patient not taking: Reported on 2020) 1 Device 0       Social History     Socioeconomic History     Marital status:      Spouse name: Not on file     Number of children: Not on file     Years of education: Not on file     Highest education level: Not on file   Occupational History     Not on file   Social Needs     Financial resource strain: Not on file     Food insecurity     Worry: Not on file     Inability: Not on file     Transportation needs     Medical: Not on file     Non-medical: Not on file   Tobacco Use     Smoking status: Former Smoker     Packs/day: 0.50     Years: 15.00     Pack years: 7.50     Types: Cigarettes     Quit date: 9/3/2006     Years since quittin.2     Smokeless tobacco: Never Used   Substance and Sexual Activity     Alcohol use: No     Drug use: No     Sexual activity: Never   Lifestyle     Physical activity     Days per week: Not on file     Minutes per session: Not on file     Stress: Not  on file   Relationships     Social connections     Talks on phone: Not on file     Gets together: Not on file     Attends Yarsanism service: Not on file     Active member of club or organization: Not on file     Attends meetings of clubs or organizations: Not on file     Relationship status: Not on file     Intimate partner violence     Fear of current or ex partner: Not on file     Emotionally abused: Not on file     Physically abused: Not on file     Forced sexual activity: Not on file   Other Topics Concern      Service No     Blood Transfusions No     Caffeine Concern No     Occupational Exposure No     Hobby Hazards No     Sleep Concern No     Stress Concern No     Weight Concern No     Special Diet No     Back Care No     Exercise Yes     Comment: 20 min a night     Bike Helmet No     Seat Belt Yes     Self-Exams Yes     Parent/sibling w/ CABG, MI or angioplasty before 65F 55M? Not Asked   Social History Narrative     Not on file          Allergies   Allergen Reactions     Hydrocodone-Acetaminophen Itching     Oxycodone Itching     Sulfa Drugs Hives, Itching and Swelling     Hives and itching       No results found for this or any previous visit (from the past 24 hour(s)).         Review of Systems:     12 point review of systems negative unless stated in HPI           Physical Exam:     Vitals:    12/14/20 1410 12/14/20 1415   BP: (!) 155/81 (!) 137/101   BP Location: Right arm Right arm   Pulse: 66    Resp: 18    Temp: 98.4  F (36.9  C)    TempSrc: Oral    SpO2: 98%      There is no height or weight on file to calculate BMI.    GENERAL APPEARANCE: healthy, alert and no distress, sitting upright in chair.   EYES: Eyes grossly normal to inspection  RESP: lungs clear to auscultation - no rales, rhonchi or wheezes  CV: regular rate and rhythm,  and no murmur, click,  rub or gallop  ABDOMEN: soft, nontender, without hepatosplenomegaly or masses  MS: Right knee with tenderness to palpation along joint  line; no erythema, warmth, or effusion noted; full ROM.  SKIN: Hyperpigmentation present under bilateral breasts, no significant rash present.   NEURO: Left hand with negative Tinel, positive Phalen, 4/5 strength. Otherwise sensory exam grossly normal, mentation appears intact and speech normal  PSYCH: mood and affect normal/bright    Assessment and Plan     (M25.561) Arthralgia of right knee  (primary encounter diagnosis): Atraumatic right knee pain. XR today with no acute fractures, no bone spurs, and no significant joint narrowing noted. Radiology read pending. Discussed results with patient. Will trial Naproxen to relieve pain. If not improving, consider PT or joint injection.    - XR Knee Standing Right 2 Views  - Naproxen (NAPROSYN) 500 MG tablet    (G56.23) Entrapment of left ulnar nerves: History and distribution of symptoms consistent with left ulnar nerve entrapment at wrist. Given duration of symptoms and associated hand weakness, will refer to Orthopedics for possible EMG and release procedure.   - [facility]    (B37.2) Intertriginous candidiasis: Rash to bilateral breasts consistent with intertrigo. Anti-fungal powder prescribed as below.   - Miconazole (MICATIN) 2 % external powder    Options for treatment and follow-up care were reviewed with the patient and/or guardian. Bear Obregon and/or guardian engaged in the decision making process and verbalized understanding of the options discussed and agreed with the final plan.    Anita Gill, MS3    I was present with the medical student who participated in the service and in the documentation of the note. I have verified the history and personally performed the physical exam and medical decision making. I agree with the assessment and plan of care as documented in the note.    Michelle Armstrong MD  [facility], PGY-2  Pager # [phone]    Precepted today with: Dr. Taylor

## 2020-12-16 NOTE — RESULT ENCOUNTER NOTE
Reviewed with patient during visit and radiology read consistent with impression given at that time.    Dr. Armstrong

## 2020-12-22 ENCOUNTER — HOSPITAL ENCOUNTER (OUTPATIENT)
Dept: CT IMAGING | Facility: HOSPITAL | Age: 60
Discharge: HOME | End: 2020-12-22

## 2020-12-22 DIAGNOSIS — Z87.891 HISTORY OF TOBACCO USE: ICD-10-CM

## 2021-02-10 ENCOUNTER — HOSPITAL ENCOUNTER (OUTPATIENT)
Dept: MAMMOGRAPHY | Facility: CLINIC | Age: 61
Discharge: HOME | End: 2021-02-10

## 2021-02-10 DIAGNOSIS — Z12.31 VISIT FOR SCREENING MAMMOGRAM: ICD-10-CM

## 2021-02-19 DIAGNOSIS — Z00.00 ENCOUNTER FOR MEDICARE ANNUAL WELLNESS EXAM: ICD-10-CM

## 2021-02-19 NOTE — TELEPHONE ENCOUNTER
Message to physician:     Date of last visit: 12/14/2020     Date of next visit if scheduled: none    Last Comprehensive Metabolic Panel:  Sodium   Date Value Ref Range Status   05/23/2017 142.0 133.0 - 144.0 mmol/L Final     Potassium   Date Value Ref Range Status   05/23/2017 3.9 3.4 - 5.3 mmol/L Final     Chloride   Date Value Ref Range Status   05/23/2017 105.0 94.0 - 109.0 mmol/L Final     Carbon Dioxide   Date Value Ref Range Status   05/23/2017 29.0 20.0 - 32.0 mmol/L Final     Glucose   Date Value Ref Range Status   05/23/2017 124.0 (H) 60.0 - 109.0 mg/dL Final     Urea Nitrogen   Date Value Ref Range Status   05/23/2017 10.0 7.0 - 30.0 mg/dL Final     Creatinine   Date Value Ref Range Status   05/23/2017 0.7 0.6 - 1.3 mg/dL Final     Calcium   Date Value Ref Range Status   05/23/2017 9.2 8.5 - 10.4 mg/dL Final       BP Readings from Last 3 Encounters:   12/14/20 (!) 137/101   12/08/20 120/76   12/10/19 125/77       Lab Results   Component Value Date    A1C 5.8 12/08/2020                Please complete refill and CLOSE ENCOUNTER.  Closing the encounter signifies the refill is complete.

## 2021-02-23 RX ORDER — ATORVASTATIN CALCIUM 80 MG/1
80 TABLET, FILM COATED ORAL DAILY
Qty: 90 TABLET | Refills: 3 | Status: SHIPPED | OUTPATIENT
Start: 2021-02-23 | End: 2022-02-25

## 2021-03-01 ENCOUNTER — AMBULATORY - HEALTHEAST (OUTPATIENT)
Dept: ADMINISTRATIVE | Facility: REHABILITATION | Age: 61
End: 2021-03-01

## 2021-03-01 ENCOUNTER — TELEPHONE (OUTPATIENT)
Dept: FAMILY MEDICINE | Facility: CLINIC | Age: 61
End: 2021-03-01

## 2021-03-01 ENCOUNTER — OFFICE VISIT (OUTPATIENT)
Dept: FAMILY MEDICINE | Facility: CLINIC | Age: 61
End: 2021-03-01
Payer: MEDICARE

## 2021-03-01 VITALS
WEIGHT: 213 LBS | BODY MASS INDEX: 37.74 KG/M2 | HEART RATE: 71 BPM | RESPIRATION RATE: 18 BRPM | OXYGEN SATURATION: 99 % | DIASTOLIC BLOOD PRESSURE: 86 MMHG | TEMPERATURE: 98.1 F | HEIGHT: 63 IN | SYSTOLIC BLOOD PRESSURE: 136 MMHG

## 2021-03-01 DIAGNOSIS — E66.01 MORBID OBESITY (H): ICD-10-CM

## 2021-03-01 DIAGNOSIS — M54.2 NECK PAIN: Primary | ICD-10-CM

## 2021-03-01 DIAGNOSIS — H61.23 BILATERAL IMPACTED CERUMEN: ICD-10-CM

## 2021-03-01 DIAGNOSIS — M54.2 NECK PAIN: ICD-10-CM

## 2021-03-01 PROCEDURE — 99213 OFFICE O/P EST LOW 20 MIN: CPT | Mod: GC | Performed by: STUDENT IN AN ORGANIZED HEALTH CARE EDUCATION/TRAINING PROGRAM

## 2021-03-01 RX ORDER — CYCLOBENZAPRINE HCL 5 MG
5 TABLET ORAL 3 TIMES DAILY PRN
Qty: 20 TABLET | Refills: 0 | Status: SHIPPED | OUTPATIENT
Start: 2021-03-01 | End: 2022-03-16

## 2021-03-01 RX ORDER — LIDOCAINE 50 MG/G
1 PATCH TOPICAL EVERY 24 HOURS
Qty: 15 PATCH | Refills: 0 | Status: SHIPPED | OUTPATIENT
Start: 2021-03-01 | End: 2022-03-16

## 2021-03-01 ASSESSMENT — MIFFLIN-ST. JEOR: SCORE: 1490.29

## 2021-03-01 NOTE — PROGRESS NOTES
Assessment and Plan     (M54.2) Neck pain  (primary encounter diagnosis)  Comment: Patient reports with improvement of symptoms on Flexeril and Lidocaine patches to the point where she could do neck stretches. However, she ran out of medications on Saturday and had significant discomfort yesterday impacting sleep. Attempted to do OMT today but patient with significant tenderness to light touch. Will refill medications to allow more time to improve.   Plan: PHYSICAL THERAPY REFERRAL, cyclobenzaprine         (FLEXERIL) 5 MG tablet, lidocaine (LIDODERM) 5         % patch, OMT and PT referral     (H61.23) Bilateral impacted cerumen  Comment: Patient attempted to remove ear wax with Qgrips tool. Educated patient to avoid any ear wax removal tools that go into ear. Will provide Debrox to help with cerumen buildup.   Plan: carbamide peroxide (DEBROX) 6.5 % otic solution    Options for treatment and follow-up care were reviewed with the patient and/or guardian. Pt and/or guardian engaged in the decision making process and verbalized understanding of the options discussed and agreed with the final plan.    I was present with the medical student for the service. I personally verified the history of present illness and performed the physical examination and medical decision making. I have verified all of the medical students documentation for this encounter.  Precepted today with: MD Asutin Will, MS4  [employer] [facility] [employer]    Michelle Armstrong MD  [facility] Resident PGY-2  [facility]  Pager: [phone]           HPI:       Bear Obregon is a 61 year old  female with PMH of carpal tunnel, osteoarthritis, and obesity who presents for follow up from emergency department visit for neck pain. Patient reports she fell asleep on her stomach with head on pillows and woke up with a crook in her neck on 02/18. She initially tried to use heat but the  pain worsened so she went to the emergency room on 02/20 at which time she was prescribed a week of flexeril and lidocaine. Bear found some stretches online that have helped with neck pain.    Patient ran out of Flexaril and lidocaine patches on Saturday. She awoke Sunday (02/28) with return of the neck pain and muscle spasms.   She was unable to sleep sunday night due to the pain getting ~3 hours of sleep. Endorses tossing and turning all night due to pain. She Took Aleve with minimal relief from pain. Pain today is 5/10.     Onset: 02/18   Palliative/provoking: Worse with movement or touching  Taken any medications: Flexeril, Lidocaine patches, Aleve  Quality: Dull Aching  Radiating: None  Severity: 5/10  Other associated features: Muscle Spasms         PMHX:     Patient Active Problem List   Diagnosis     Hyperlipidemia     Tobacco use disorder     Obesity     Elevated blood pressure reading without diagnosis of hypertension     History of colonic polyps     Anxiety state     Disorder of bursae and tendons in shoulder region     Neck pain     Morbid obesity (H)       Current Outpatient Medications   Medication Sig Dispense Refill     atorvastatin (LIPITOR) 80 MG tablet Take 1 tablet (80 mg) by mouth daily 90 tablet 3     carbamide peroxide (DEBROX) 6.5 % otic solution Place 5 drops into both ears 2 times daily 15 mL 0     cyclobenzaprine (FLEXERIL) 5 MG tablet Take 1 tablet (5 mg) by mouth 3 times daily as needed for muscle spasms 20 tablet 0     lidocaine (LIDODERM) 5 % patch Place 1 patch onto the skin every 24 hours To prevent lidocaine toxicity, patient should be patch free for 12 hrs daily. 15 patch 0     sertraline (ZOLOFT) 50 MG tablet Take 1 tablet (50 mg) by mouth daily 90 tablet 4     miconazole (MICATIN) 2 % external powder Apply topically as needed for itching or other (Use under both breasts) (Patient not taking: Reported on 3/1/2021) 43 g 0     omeprazole (PRILOSEC) 20 MG CR capsule Take 1 capsule  (20 mg) by mouth daily (Patient not taking: Reported on 2020) 90 capsule 1     order for DME Equipment being ordered: Right wrist splint with thumb spica (Patient not taking: Reported on 2020) 1 Device 0       Social History     Socioeconomic History     Marital status:      Spouse name: Not on file     Number of children: Not on file     Years of education: Not on file     Highest education level: Not on file   Occupational History     Not on file   Social Needs     Financial resource strain: Not on file     Food insecurity     Worry: Not on file     Inability: Not on file     Transportation needs     Medical: Not on file     Non-medical: Not on file   Tobacco Use     Smoking status: Former Smoker     Packs/day: 0.50     Years: 15.00     Pack years: 7.50     Types: Cigarettes     Quit date: 9/3/2006     Years since quittin.5     Smokeless tobacco: Never Used   Substance and Sexual Activity     Alcohol use: No     Drug use: No     Sexual activity: Never   Lifestyle     Physical activity     Days per week: Not on file     Minutes per session: Not on file     Stress: Not on file   Relationships     Social connections     Talks on phone: Not on file     Gets together: Not on file     Attends Confucianist service: Not on file     Active member of club or organization: Not on file     Attends meetings of clubs or organizations: Not on file     Relationship status: Not on file     Intimate partner violence     Fear of current or ex partner: Not on file     Emotionally abused: Not on file     Physically abused: Not on file     Forced sexual activity: Not on file   Other Topics Concern      Service No     Blood Transfusions No     Caffeine Concern No     Occupational Exposure No     Hobby Hazards No     Sleep Concern No     Stress Concern No     Weight Concern No     Special Diet No     Back Care No     Exercise Yes     Comment: 20 min a night     Bike Helmet No     Seat Belt Yes     Self-Exams  "Yes     Parent/sibling w/ CABG, MI or angioplasty before 65F 55M? Not Asked   Social History Narrative     Not on file       Allergies   Allergen Reactions     Hydrocodone-Acetaminophen Itching     Oxycodone Itching     Sulfa Drugs Hives, Itching and Swelling     Hives and itching       No results found for this or any previous visit (from the past 24 hour(s)).         Review of Systems:     ROS pertinent positives: Left ear pain   ROS pertinent negatives None  ROS:  GEN: no weight gain/loss  LUNGS: no dyspnea, wheeze, cough  COR: no chest pain, palpitations, PND  12 point review of systems negative unless stated in HPI           Physical Exam:     Vitals:    03/01/21 0807   BP: 136/86   BP Location: Right arm   Pulse: 71   Resp: 18   Temp: 98.1  F (36.7  C)   TempSrc: Oral   SpO2: 99%   Weight: 96.6 kg (213 lb)   Height: 1.6 m (5' 3\")     Body mass index is 37.73 kg/m .    GENERAL APPEARANCE: healthy, alert and no distress,   EYES: Eyes grossly normal to inspection,  PERRL  HEENT: TMs normal bilaterally, gray, normal light reflex, significant cerumen left ear, OP without erythema, no cervical lymphadenopathy  NECK: no adenopathy, no asymmetry, masses, or scars and thyroid normal to palpation  RESP: lungs clear to auscultation - no rales, rhonchi or wheezes  CV: regular rate and rhythm,  and no murmur, click,  rub or gallop  ABDOMEN: soft, nontender, without hepatosplenomegaly or masses  MS: extremities normal- no gross deformities noted,  Significant tenderness to palpation on anterior left neck  SKIN: no suspicious lesions or rashes  NEURO: Normal strength and tone, sensory exam grossly normal, mentation appears intact and speech normal  PSYCH: mood and affect normal/bright  "

## 2021-03-01 NOTE — TELEPHONE ENCOUNTER
Plan:   1) Routed to PCP as FYI  2) Routed to PCP Team to communicate the following to patient:    We received notification from your pharmacy that Lidocaine 5% patch is not covered by your insurance.    Our clinic policy is to not pursue insurance coverage for this medicine as these requests are usually not approved. We recommend you purchase out of pocket if medicine desired. If you need assistance selecting the correct product, please ask your pharmacist for help. Another option to reduce cost of medicine is through coupons that may be available on [employer].    If this plan does not work, please let us know and we will send a message to your doctor to see if another medicine is available to suit your needs.     Jeanie Cleveland MA March 1, 2021 at 11:50 AM

## 2021-03-01 NOTE — PROGRESS NOTES
Preceptor Attestation:  Patient seen, examined, and discussed with the resident..  I agree with written assessment and plan of care.  Supervising Physician:  Marianna Amato MD  [facility]

## 2021-03-08 ENCOUNTER — OFFICE VISIT (OUTPATIENT)
Dept: FAMILY MEDICINE | Facility: CLINIC | Age: 61
End: 2021-03-08
Payer: MEDICARE

## 2021-03-08 VITALS
RESPIRATION RATE: 20 BRPM | WEIGHT: 212.2 LBS | TEMPERATURE: 97.6 F | OXYGEN SATURATION: 97 % | BODY MASS INDEX: 39.05 KG/M2 | SYSTOLIC BLOOD PRESSURE: 144 MMHG | DIASTOLIC BLOOD PRESSURE: 75 MMHG | HEIGHT: 62 IN | HEART RATE: 67 BPM

## 2021-03-08 DIAGNOSIS — M54.2 NECK PAIN ON LEFT SIDE: Primary | ICD-10-CM

## 2021-03-08 DIAGNOSIS — M99.01 SOMATIC DYSFUNCTION OF CERVICAL REGION: ICD-10-CM

## 2021-03-08 PROCEDURE — 98925 OSTEOPATH MANJ 1-2 REGIONS: CPT | Mod: GC | Performed by: STUDENT IN AN ORGANIZED HEALTH CARE EDUCATION/TRAINING PROGRAM

## 2021-03-08 ASSESSMENT — MIFFLIN-ST. JEOR: SCORE: 1474.75

## 2021-03-14 NOTE — PROGRESS NOTES
ASSESSMENT/PLAN:   Bear Obregon is a 61 year old female who presents to clinic today for the following health issues:    (I10) Essential hypertension  BP goal <150/90 with age >59yo and no known diabetes/CKD/etc. BP elevated today in clinic at 164/76, 172/77 initially. Did improve at end of visit to 147/75. Has been elevated BP in past visits, recently 3/8 >160 systolic and >150 systolic prior to that. Given BP >160 today, feel it is reasonable to start medication. Risks and benefits of CCB vs ACE/ARB vs diuretic discussed. She wanted ACE as many family members on this. I discussed risk of cough and angioedema in particular. She understood and was in agreement with starting. Emphasized diet (low salt) and exercise. Encouraged home BP monitoring. Follow up visit in two weeks for BP recheck and likely BMP. Already has high intensity statin on board. Of note, she is an active smoker and BMI >35. Plan to discuss this in context of elevated blood pressure / HTN.   - lisinopril (ZESTRIL) 10 MG tablet; Take 1 tablet (10 mg) by mouth daily  Dispense: 30 tablet; Refill: 1    (M54.2) Neck pain on left side  (primary encounter diagnosis)  (M99.01) Somatic dysfunction of cervical region  Comment: Improved from last week, though persistent pain. Cervical somatic dysfunction noted of trapezius as on exam and procedure note.   Plan: performed OMT as below. She has Aleve available. Does not have to use Flexeril by any means especially if it makes it hard to sleep. Requested acetaminophen today and I provided this; encouraged more use of acetaminophen over Aleve, as this may help BP. She will establish with PT tomorrow. She may follow up for OMT any time she likes.   Of note, her left hand numbness should be followed up at some point. It appears an EMG was ordered but not clear if this was done.   - OSTEOPATHIC MANIP,1-2 BODY REGN  - acetaminophen (TYLENOL) 500 MG tablet; Take 1-2 tablets (500-1,000 mg) by mouth every 6 hours  as needed for mild pain  Dispense: 30 tablet; Refill: 3     OMT PROCEDURE NOTE     Body Region: Cervical      Discussed risks and benefits of OMT. She states understanding and agreeable to procedure as below.      Somatic Dysfunction #1: Left cervical somatic dysfunction (trapezius)   Treatment: counterstrain and Soft Tissue   Outcome: Improved. Started at 4/10 at beginning of visit. Improved to 2/10 following treatment.     Hailee Sandhu DO   [facility] Resident   Pager#[phone]    Precepted patient with Dr. Marianna Amato.     Options for treatment and follow-up care were reviewed with the patient and/or guardian. Bear Obregon and/or guardian engaged in the decision making process and verbalized understanding of the options discussed and agreed with the final plan    CHIEF COMPLAINT                                                      Chief Complaint   Patient presents with     OMT     Left shoulder and neck pain     Hypertension     BP F/U     Medication Reconciliation     Completed     Medication Request     Pt. requesting an alternative for flexeril as it makes it hard to sleep per patient     SUBJECTIVE:                                                    Bear Obregon is a 61 year old female who presents to clinic today for the following health issues:     Neck pain   Onset: 2/18/21 after sleeping on it the wrong way. No clear injury.    Please see documentation from last 2 visits for further details.   Since 1 week ago,   Pain has overall improved.   Pain did flare last night after lifting a heavy load of laundry.   Pain was 10/10. Shower helped   Now down to 4/10.    Remitting factors: mild relief with aleve, and stretches. Flexeril does not seem to help.   Will establish with PT tomorrow.   No new arm pain, numbness, weakness, tingling, fever, etc.   Continues to reports some mild left 4th and 5th finger numbness that has been chronic for years; no change with recent neck pain.    ----------------------------------------------------------------------------------------------------------------------  Patient Active Problem List   Diagnosis     Hyperlipidemia     Tobacco use disorder     Obesity     Elevated blood pressure reading without diagnosis of hypertension     History of colonic polyps     Anxiety state     Disorder of bursae and tendons in shoulder region     Neck pain     Morbid obesity (H)    Past Surgical History:   Procedure Laterality Date     C APPENDECTOMY       C  DELIVERY ONLY      x 3     EXC SKIN BENIG 0.6-1CM FACE,FACIAL      benign facial tumor removed,left     HYSTERECTOMY, JORGE LUIS      Non-cancerous reasons.  Uterine adhesions.     ROTATOR CUFF REPAIR RT/LT      Bilateral     SALPINGO OOPHORECTOMY,R/L/RAUL      Bilateral       Social History     Social History Narrative     Not on file     Family History   Problem Relation Age of Onset     Hypertension Mother      Heart Disease Mother      Hypertension Brother      Diabetes Brother      Hypertension Sister      Cancer Father      Heart Disease Father      Asthma No family hx of      C.A.D. No family hx of      Cerebrovascular Disease No family hx of      Breast Cancer No family hx of      Cancer - colorectal No family hx of      Prostate Cancer No family hx of            No results found for this or any previous visit (from the past 24 hour(s)).  Problem list and past medical, surgical, social, and family histories reviewed & adjusted, as indicated.    Current Outpatient Medications   Medication Sig Dispense Refill     atorvastatin (LIPITOR) 80 MG tablet Take 1 tablet (80 mg) by mouth daily 90 tablet 3     carbamide peroxide (DEBROX) 6.5 % otic solution Place 5 drops into both ears 2 times daily 15 mL 0     cyclobenzaprine (FLEXERIL) 5 MG tablet Take 1 tablet (5 mg) by mouth 3 times daily as needed for muscle spasms 20 tablet 0     lidocaine (LIDODERM) 5 % patch Place 1 patch onto the skin every 24 hours  "To prevent lidocaine toxicity, patient should be patch free for 12 hrs daily. 15 patch 0     miconazole (MICATIN) 2 % external powder Apply topically as needed for itching or other (Use under both breasts) (Patient not taking: Reported on 3/1/2021) 43 g 0     omeprazole (PRILOSEC) 20 MG CR capsule Take 1 capsule (20 mg) by mouth daily (Patient not taking: Reported on 12/14/2020) 90 capsule 1     order for DME Equipment being ordered: Right wrist splint with thumb spica (Patient not taking: Reported on 11/19/2020) 1 Device 0     sertraline (ZOLOFT) 50 MG tablet Take 1 tablet (50 mg) by mouth daily 90 tablet 4     Medication list reviewed and updated as indicated.    Allergies   Allergen Reactions     Hydrocodone-Acetaminophen Itching     Oxycodone Itching     Sulfa Drugs Hives, Itching and Swelling     Hives and itching     Allergies reviewed and updated as indicated.  ----------------------------------------------------------------------------------------------------------------------  ROS:     ROS: 10 point ROS neg other than the symptoms noted above in the HPI.    OBJECTIVE:     BP (!) 172/77 (BP Location: Right arm, Patient Position: Sitting, Cuff Size: Adult Regular)   Pulse 62   Temp 97.3  F (36.3  C) (Oral)   Resp 20   Ht 1.59 m (5' 2.6\")   Wt 96.7 kg (213 lb 3.2 oz)   SpO2 100%   BMI 38.25 kg/m    Body mass index is 38.25 kg/m .    Physical Exam:   General: Pleasant. Comfortable. No acute distress.   CV: RRR. No murmur.   Lungs: Clear to auscultation bilaterally. No inc WOB.   Cervical spine exam:  No focal tenderness is noted along spinous processes.  There is left sided tenderness of trapezius.  Range of Motion: forward flexion full , extension full , lateral rotation full , lateral flexion full.  Pain is increased with right lateral rotation.      OMT: 1-2 cm anterior trapezius tender point with increased tissue bogginess/tightness (increased spasm/tightness relative to last week). No " vertebral/segmental cervical/thoracic dysfunctions noted.

## 2021-03-15 ENCOUNTER — OFFICE VISIT (OUTPATIENT)
Dept: FAMILY MEDICINE | Facility: CLINIC | Age: 61
End: 2021-03-15
Payer: MEDICARE

## 2021-03-15 VITALS
RESPIRATION RATE: 20 BRPM | WEIGHT: 213.2 LBS | BODY MASS INDEX: 37.78 KG/M2 | HEIGHT: 63 IN | SYSTOLIC BLOOD PRESSURE: 147 MMHG | TEMPERATURE: 97.3 F | DIASTOLIC BLOOD PRESSURE: 75 MMHG | HEART RATE: 62 BPM | OXYGEN SATURATION: 100 %

## 2021-03-15 DIAGNOSIS — M99.01 SOMATIC DYSFUNCTION OF CERVICAL REGION: ICD-10-CM

## 2021-03-15 DIAGNOSIS — I10 ESSENTIAL HYPERTENSION: ICD-10-CM

## 2021-03-15 DIAGNOSIS — M54.2 NECK PAIN ON LEFT SIDE: Primary | ICD-10-CM

## 2021-03-15 PROCEDURE — 98925 OSTEOPATH MANJ 1-2 REGIONS: CPT | Mod: GC | Performed by: STUDENT IN AN ORGANIZED HEALTH CARE EDUCATION/TRAINING PROGRAM

## 2021-03-15 PROCEDURE — 99214 OFFICE O/P EST MOD 30 MIN: CPT | Mod: 25 | Performed by: STUDENT IN AN ORGANIZED HEALTH CARE EDUCATION/TRAINING PROGRAM

## 2021-03-15 RX ORDER — ACETAMINOPHEN 500 MG
500-1000 TABLET ORAL EVERY 6 HOURS PRN
Qty: 30 TABLET | Refills: 3 | Status: SHIPPED | OUTPATIENT
Start: 2021-03-15 | End: 2022-03-16

## 2021-03-15 RX ORDER — LISINOPRIL 10 MG/1
10 TABLET ORAL DAILY
Qty: 30 TABLET | Refills: 1 | Status: SHIPPED | OUTPATIENT
Start: 2021-03-15 | End: 2021-03-26

## 2021-03-15 ASSESSMENT — MIFFLIN-ST. JEOR: SCORE: 1484.81

## 2021-03-15 NOTE — Clinical Note
Thanks for reviewing, Suraj Amato and Kenji!    Dr. Phillip, copying you on this chart to ensure that I billed correctly. She is an established patient for whom I perfomed OMT (so procedure code with order for OMT) and new diagnosis of HTN with new script for antihypertensive (so should be level 4).     Please let me know if you have any adjustments / further considerations!      Thanks,    [place]

## 2021-03-16 ENCOUNTER — TRANSFERRED RECORDS (OUTPATIENT)
Dept: HEALTH INFORMATION MANAGEMENT | Facility: CLINIC | Age: 61
End: 2021-03-16

## 2021-03-16 ENCOUNTER — OFFICE VISIT - HEALTHEAST (OUTPATIENT)
Dept: PHYSICAL THERAPY | Facility: REHABILITATION | Age: 61
End: 2021-03-16

## 2021-03-16 DIAGNOSIS — R29.3 POOR POSTURE: ICD-10-CM

## 2021-03-16 DIAGNOSIS — M54.2 CERVICALGIA: ICD-10-CM

## 2021-03-16 DIAGNOSIS — M62.81 GENERALIZED MUSCLE WEAKNESS: ICD-10-CM

## 2021-03-16 PROBLEM — I10 ESSENTIAL HYPERTENSION: Status: ACTIVE | Noted: 2021-03-16

## 2021-03-21 ENCOUNTER — SURGERY - HEALTHEAST (OUTPATIENT)
Dept: SURGERY | Facility: HOSPITAL | Age: 61
End: 2021-03-21

## 2021-03-21 ENCOUNTER — ANESTHESIA - HEALTHEAST (OUTPATIENT)
Dept: SURGERY | Facility: HOSPITAL | Age: 61
End: 2021-03-21

## 2021-03-21 ASSESSMENT — MIFFLIN-ST. JEOR: SCORE: 1456.27

## 2021-03-22 ENCOUNTER — COMMUNICATION - HEALTHEAST (OUTPATIENT)
Dept: SCHEDULING | Facility: CLINIC | Age: 61
End: 2021-03-22

## 2021-03-23 ASSESSMENT — MIFFLIN-ST. JEOR: SCORE: 1488.47

## 2021-03-24 ASSESSMENT — MIFFLIN-ST. JEOR: SCORE: 1487.57

## 2021-03-26 DIAGNOSIS — I10 ESSENTIAL HYPERTENSION: ICD-10-CM

## 2021-03-26 RX ORDER — LISINOPRIL 10 MG/1
10 TABLET ORAL DAILY
Qty: 90 TABLET | Refills: 1 | Status: SHIPPED | OUTPATIENT
Start: 2021-03-26 | End: 2021-04-06

## 2021-03-26 ASSESSMENT — MIFFLIN-ST. JEOR: SCORE: 1456.27

## 2021-04-01 ENCOUNTER — OFFICE VISIT - HEALTHEAST (OUTPATIENT)
Dept: SURGERY | Facility: CLINIC | Age: 61
End: 2021-04-01

## 2021-04-01 DIAGNOSIS — K56.609 SBO (SMALL BOWEL OBSTRUCTION) (H): ICD-10-CM

## 2021-04-06 ENCOUNTER — OFFICE VISIT (OUTPATIENT)
Dept: FAMILY MEDICINE | Facility: CLINIC | Age: 61
End: 2021-04-06
Payer: MEDICARE

## 2021-04-06 VITALS
BODY MASS INDEX: 36.11 KG/M2 | HEIGHT: 63 IN | OXYGEN SATURATION: 97 % | DIASTOLIC BLOOD PRESSURE: 84 MMHG | SYSTOLIC BLOOD PRESSURE: 144 MMHG | WEIGHT: 203.8 LBS | RESPIRATION RATE: 18 BRPM | TEMPERATURE: 98.2 F | HEART RATE: 71 BPM

## 2021-04-06 DIAGNOSIS — I10 ESSENTIAL HYPERTENSION: ICD-10-CM

## 2021-04-06 LAB
BUN SERPL-MCNC: 9 MG/DL (ref 7–30)
CALCIUM SERPL-MCNC: 9.4 MG/DL (ref 8.5–10.4)
CHLORIDE SERPLBLD-SCNC: 106 MMOL/L (ref 94–109)
CO2 SERPL-SCNC: 30 MMOL/L (ref 20–32)
CREAT SERPL-MCNC: 0.5 MG/DL (ref 0.6–1.3)
EGFR CALCULATED (NON BLACK REFERENCE): >90 ML/MIN
GLUCOSE SERPL-MCNC: 109 MG/DL (ref 60–109)
POTASSIUM SERPL-SCNC: 3.7 MMOL/L (ref 3.4–5.3)
SODIUM SERPL-SCNC: 145 MMOL/L (ref 133–144)

## 2021-04-06 PROCEDURE — 99495 TRANSJ CARE MGMT MOD F2F 14D: CPT | Mod: GC | Performed by: STUDENT IN AN ORGANIZED HEALTH CARE EDUCATION/TRAINING PROGRAM

## 2021-04-06 PROCEDURE — 80048 BASIC METABOLIC PNL TOTAL CA: CPT | Performed by: STUDENT IN AN ORGANIZED HEALTH CARE EDUCATION/TRAINING PROGRAM

## 2021-04-06 PROCEDURE — 36415 COLL VENOUS BLD VENIPUNCTURE: CPT | Performed by: STUDENT IN AN ORGANIZED HEALTH CARE EDUCATION/TRAINING PROGRAM

## 2021-04-06 RX ORDER — LISINOPRIL 10 MG/1
40 TABLET ORAL DAILY
Qty: 120 TABLET | Refills: 0 | Status: SHIPPED | OUTPATIENT
Start: 2021-04-06 | End: 2021-04-20

## 2021-04-06 ASSESSMENT — MIFFLIN-ST. JEOR: SCORE: 1448.56

## 2021-04-06 NOTE — PROGRESS NOTES
"  Hospitalization Follow-up Visit         HPI       Hospital Follow-up Visit:    Hospital:  [facility]   Date of Admission: 3/21/21  Date of Discharge: 3/26/21  Reason(s) for Admission: SBO            Problems taking medications regularly:  None       Post Discharge Medication Reconciliation: discharge medications reconciled and changed, per note/orders. Increased lisinopril to 40 mg daily.       Problems adhering to non-medication therapy:  None       Medications reviewed by: by myself. Findings include increasing lisinopril.    Summary of hospitalization:  [facility] discharge summary reviewed  Diagnostic Tests/Treatments reviewed.  Follow up needed: none  Other Healthcare Providers Involved in Patient s Care:         Followed up with surgery already  Update since discharge: improved. Has had BM and passing flatus. No trouble with po intake.                Review of Systems:   CONSTITUTIONAL: no fatigue, no unexpected change in weight  SKIN: no worrisome rashes, no worrisome moles, no worrisome lesions  EYES: no acute vision problems or changes  ENT: no ear problems, no mouth problems, no throat problems  RESP: no significant cough, no shortness of breath  CV: no chest pain, no palpitations, no new or worsening peripheral edema  GI: no nausea, no vomiting, no constipation, no diarrhea            Physical Exam:     Vitals:    04/06/21 0811   BP: (!) 144/84   Pulse: 71   Resp: 18   Temp: 98.2  F (36.8  C)   TempSrc: Oral   SpO2: 97%   Weight: 92.4 kg (203 lb 12.8 oz)   Height: 1.6 m (5' 3\")     Body mass index is 36.1 kg/m .    GENERAL: healthy, alert and no distress  RESP: lungs clear to auscultation - no rales, no rhonchi, no wheezes  CV: regular rates and rhythm, normal S1 S2, no S3 or S4 and no murmur, no click or rub -  ABDOMEN: soft, no tenderness, non-distended, incision clean, dry, intact with no erythema, purulence, or signs of infection. Normal bowel sounds  MS: extremities- no gross deformities " noted, no edema  SKIN: no suspicious lesions, no rashes  NEURO: strength and tone- normal, sensory exam- grossly normal, mentation- intact, speech- normal, reflexes- symmetric  PSYCH: Alert and oriented times 3; speech- coherent , normal rate and volume; able to articulate logical thoughts, able to abstract reason, no tangential thoughts, no hallucinations or delusions, affect- normal         Results:   Results from the last 24 hours No results found for this or any previous visit (from the past 24 hour(s)).    Assessment and Plan      Bear was seen today for hypertension.    Diagnoses and all orders for this visit:    Essential hypertension  Patient overall doing well post ex lap for SBO and has had her follow up with surgery clinic. Will increase her lisinopril now and recheck in 2 weeks. BP almost to goal of <140/90.  -     lisinopril (ZESTRIL) 10 MG tablet; Take 4 tablets (40 mg) by mouth daily  -     Basic Metabolic Panel ([facility]) - Results < 1 hr    Type of decision making: Moderate complexity (64064)      Options for treatment and follow-up care were reviewed with the patient and/or guardian. Pt and/or guardian engaged in the decision making process and verbalized understanding of the options discussed and agreed with the final plan.    Precepted today with: MD Michelle Feng MD  [facility] Family Medicine Resident PGY-2  [facility]  Pager: [phone]

## 2021-04-06 NOTE — PROGRESS NOTES
Preceptor Attestation:   Patient seen, evaluated and discussed with the resident. I have verified the content of the note, which accurately reflects my assessment of the patient and the plan of care.  Supervising Physician:Leonidas Neal MD  [facility]

## 2021-04-06 NOTE — LETTER
April 9, 2021      Bear Obregon  [facility] [place] [address]        Dear ,    We are writing to inform you of your test results.    You lab work came back normal for your electrolytes and your kidney function is normal as well. Please call the clinic if you have any questions.     Resulted Orders   Basic Metabolic Panel ([facility]) - Results < 1 hr   Result Value Ref Range    Glucose 109.0 60.0 - 109.0 mg/dL    Urea Nitrogen 9.0 7.0 - 30.0 mg/dL    Creatinine 0.5 (L) 0.6 - 1.3 mg/dL    Sodium 145.0 (H) 133.0 - 144.0 mmol/L    Potassium 3.7 3.4 - 5.3 mmol/L    Chloride 106.0 94.0 - 109.0 mmol/L    Carbon Dioxide 30.0 20.0 - 32.0 mmol/L    Calcium 9.4 8.5 - 10.4 mg/dL    eGFR Calculated (Non Black Reference) >90 >60.0 mL/min      Comment:      eGFR is calculated by the CKD-EPI creatinine equation, without race   adjustment. eGFR can be influenced by muscle mass, exercise, and diet. The   reported eGFR is an estimation only and is only applicable if the renal   function is stable.         If you have any questions or concerns, please call the clinic at the number listed above.       Sincerely,      Michelle Armstrong MD

## 2021-04-09 NOTE — RESULT ENCOUNTER NOTE
Please call patient with the following:    Alexander Sifuentes,    You lab work came back normal for your electrolytes and your kidney function is normal as well. Please call the clinic if you have any questions.    Dr. Armstrong

## 2021-04-20 ENCOUNTER — OFFICE VISIT (OUTPATIENT)
Dept: FAMILY MEDICINE | Facility: CLINIC | Age: 61
End: 2021-04-20
Payer: MEDICARE

## 2021-04-20 ENCOUNTER — COMMUNICATION - HEALTHEAST (OUTPATIENT)
Dept: SURGERY | Facility: CLINIC | Age: 61
End: 2021-04-20

## 2021-04-20 VITALS
OXYGEN SATURATION: 96 % | DIASTOLIC BLOOD PRESSURE: 78 MMHG | SYSTOLIC BLOOD PRESSURE: 131 MMHG | HEART RATE: 68 BPM | RESPIRATION RATE: 18 BRPM

## 2021-04-20 DIAGNOSIS — I10 ESSENTIAL HYPERTENSION: Primary | ICD-10-CM

## 2021-04-20 PROCEDURE — 99213 OFFICE O/P EST LOW 20 MIN: CPT | Mod: GC | Performed by: STUDENT IN AN ORGANIZED HEALTH CARE EDUCATION/TRAINING PROGRAM

## 2021-04-20 RX ORDER — LISINOPRIL 40 MG/1
40 TABLET ORAL DAILY
Qty: 90 TABLET | Refills: 0 | Status: SHIPPED | OUTPATIENT
Start: 2021-04-20 | End: 2021-07-16

## 2021-04-21 NOTE — PROGRESS NOTES
Assessment and Plan     1. Essential hypertension   Upon review of medications, patient is prescribed 40 mg in 10 mg tablets. Will change to 40 mg with 1 tablet to decrease pill burden and ensure compliance. Continue current dose regimen with no adjustments.   - F/u in 3-4 months  - Gave home bp cuff  - lisinopril (ZESTRIL) 40 MG tablet; Take 1 tablet (40 mg) by mouth daily  Dispense: 90 tablet; Refill: 0      Options for treatment and follow-up care were reviewed with the patient and/or guardian. Pt and/or guardian engaged in the decision making process and verbalized understanding of the options discussed and agreed with the final plan.    Precepted today with: MD Michelle Bear MD  [facility] Family Medicine Resident PGY-2  [facility]  Pager: [phone]           HPI:       Bear Obregon is a 61 year old  female with PMH of HTN who presents for HTN follow up    Patient states she is feeling fine. She denies any blurry vision,  HA, chest pain, sob. No FND. She continues to eat a low sodium diet. Patient states she is taking 20 mg daily of Lisinopril but is using 4 tablets daily. Patient does not have a home bp cuff.           PMHX:     Patient Active Problem List   Diagnosis     Hyperlipidemia     Tobacco use disorder     Obesity     Elevated blood pressure reading without diagnosis of hypertension     History of colonic polyps     Anxiety state     Disorder of bursae and tendons in shoulder region     Neck pain     Morbid obesity (H)     Essential hypertension       Current Outpatient Medications   Medication Sig Dispense Refill     acetaminophen (TYLENOL) 500 MG tablet Take 1-2 tablets (500-1,000 mg) by mouth every 6 hours as needed for mild pain 30 tablet 3     atorvastatin (LIPITOR) 80 MG tablet Take 1 tablet (80 mg) by mouth daily 90 tablet 3     carbamide peroxide (DEBROX) 6.5 % otic solution Place 5 drops into both ears 2 times daily 15 mL 0      cyclobenzaprine (FLEXERIL) 5 MG tablet Take 1 tablet (5 mg) by mouth 3 times daily as needed for muscle spasms 20 tablet 0     lidocaine (LIDODERM) 5 % patch Place 1 patch onto the skin every 24 hours To prevent lidocaine toxicity, patient should be patch free for 12 hrs daily. 15 patch 0     lisinopril (ZESTRIL) 10 MG tablet Take 4 tablets (40 mg) by mouth daily 120 tablet 0     omeprazole (PRILOSEC) 20 MG CR capsule Take 1 capsule (20 mg) by mouth daily (Patient not taking: Reported on 12/14/2020) 90 capsule 1     order for DME Equipment being ordered: Right wrist splint with thumb spica (Patient not taking: Reported on 11/19/2020) 1 Device 0     sertraline (ZOLOFT) 50 MG tablet Take 1 tablet (50 mg) by mouth daily 90 tablet 4        Allergies   Allergen Reactions     Hydrocodone-Acetaminophen Itching     Oxycodone Itching     Sulfa Drugs Hives, Itching and Swelling     Hives and itching       No results found for this or any previous visit (from the past 24 hour(s)).         Review of Systems:     12 point review of systems negative unless stated in HPI          Physical Exam:     Vitals:    04/20/21 1434   BP: 131/78   Pulse: 68   Resp: 18   SpO2: 96%     There is no height or weight on file to calculate BMI.    GENERAL APPEARANCE: healthy, alert and no distress  EYES: Eyes grossly normal to inspection  RESP: lungs clear to auscultation - no rales, rhonchi or wheezes  CV: regular rate and rhythm,  and no murmur, click,  rub or gallop  SKIN: no suspicious lesions or rashes  NEURO: Normal strength and gait, mentation appears intact and speech normal  PSYCH: mood and affect normal/bright

## 2021-05-04 NOTE — PROGRESS NOTES
Preceptor Attestation:   Patient seen, evaluated and discussed with the resident. I have verified the content of the note, which accurately reflects my assessment of the patient and the plan of care.  Supervising Physician:Monica Rees MD  [facility]

## 2021-06-01 VITALS — WEIGHT: 226 LBS | BODY MASS INDEX: 41.59 KG/M2 | HEIGHT: 62 IN

## 2021-06-05 VITALS — HEIGHT: 62 IN | WEIGHT: 209 LBS | BODY MASS INDEX: 38.46 KG/M2

## 2021-06-11 ENCOUNTER — OFFICE VISIT (OUTPATIENT)
Dept: FAMILY MEDICINE | Facility: CLINIC | Age: 61
End: 2021-06-11
Payer: COMMERCIAL

## 2021-06-11 DIAGNOSIS — Z20.822 SUSPECTED COVID-19 VIRUS INFECTION: Primary | ICD-10-CM

## 2021-06-11 PROBLEM — K56.609 SBO (SMALL BOWEL OBSTRUCTION) (H): Status: ACTIVE | Noted: 2021-03-21

## 2021-06-11 LAB
SARS-COV-2 RNA RESP QL NAA+PROBE: NORMAL
SPECIMEN SOURCE: NORMAL

## 2021-06-11 PROCEDURE — 99214 OFFICE O/P EST MOD 30 MIN: CPT | Performed by: STUDENT IN AN ORGANIZED HEALTH CARE EDUCATION/TRAINING PROGRAM

## 2021-06-12 LAB
LABORATORY COMMENT REPORT: NORMAL
SARS-COV-2 RNA RESP QL NAA+PROBE: NEGATIVE
SPECIMEN SOURCE: NORMAL

## 2021-06-12 NOTE — PROGRESS NOTES
"    Assessment & Plan     Suspected COVID-19 virus infection  (Z20.828) Suspected COVID-19 virus infection  (primary encounter diagnosis)  (R05) Cough    Comment: Patient came in requesting COVID-19 testing. Unlikely strep throat since patient does not meet [facility] criteria.  Patient requested antibiotics.  Discussed at length that at this time and considering her symptoms that antibiotics are not indicated at this time.  However, did discuss with her that if symptoms should persist after 7 to 10 days she should return to clinic and we may consider antibiotics. Considering the patient's symptoms, discussed the natural disease progression of viral URI and COVID-19. Patient verbalized understanding. Questions asked and answered.   Patient reports she will return if illness worsens.    Plan:   -COVID-19 Virus PCR [facility] ([facility])  -COVID-19 GetWell Loop Referral  -AVS information about COVID-19 given verbally and in print  -Encouraged patient to follow CDC recommendations for household cleaning of frequently touched services.  -Encouraged conservative management (ie. Rest, adequate hydration, relief of nasal congestion/obstruction with nasal drops, and monitoring for disease progression)  -Anticipatory guidance give (hand washing, wear masks)  -Follow-up encouraged if symptoms worsen      Review of external notes as documented elsewhere in note       BMI:   Estimated body mass index is 36.1 kg/m  as calculated from the following:    Height as of 4/6/21: 1.6 m (5' 3\").    Weight as of 4/6/21: 92.4 kg (203 lb 12.8 oz).       Return if symptoms worsen or fail to improve.    Options for treatment and follow-up care were reviewed with the patient and/or guardian. Pt and/or guardian engaged in the decision making process and verbalized understanding of the options discussed and agreed with the final plan.    Precepted today with: MD Aan Paula Camarena MD, MPH (PGY 3)  [facility] " [facility] Family Medicine Resident  Pager: [phone] m [facility] [place] Subjective Zernia is a 61 year old who presents for the following health issues via curbside visit in the parking lot    HPI        History:    -Acute illness started on Tuesday 6/8/2021    Chief Complaint   Patient presents with     Cold Symptoms     running nose, scratchy throat, and sneezing, No exposure with anyone with covid but OK to do PCR for covid todat     Medication Reconciliation     Needs attention       -Ear ache: no drainage,no ringing, no hearing loss, no surgeries, no Qtip use  -Drainage in the throat  -Using Flonase, helped  -Benadryl and Sudafed  -Symptoms improving  -Patient requesting antibiotics    In the 14 days before your symptoms started, have you had close contact with a COVID-19 (Coronavirus) patient? No     In the 14 days before your symptoms started, have you traveled internationally or to a state with high rates of COVID19? No ([place] in April 2021)     Do you have a fever? No     Are you having difficulty breathing? No     Do you have a cough? No     Are you experiencing any of the following? Denies Frequent heartburn, Coughing more after eating, Coughing more when lying down and Cough more after exercise     What other symptoms have you experienced? Denies Runny Nose, Blocked Sinuses and Headache     Do you have any of the following? Denies Unexpected weight loss, Sweating at night, Loss of appetite and Fatigue     Have you ever been diagnosed with asthma, bronchitis, or lung disease? No     Do you smoke? No      Have you ever smoked? I smoked in the past, but quit      Are there people you know with similar symptoms? Yes - just infant        Review of Systems   Constitutional, HEENT, cardiovascular, pulmonary, gi and gu systems are negative, except as otherwise noted.      Objective    There were no vitals taken for this visit.  There is no height or weight on file to  calculate BMI.  Physical Exam   GENERAL: Healthy, alert and no distress  EYES: Eyes grossly normal to inspection, conjunctivae and sclerae normal  RESP: no audible wheeze, cough, or visible cyanosis.  No visible retractions or increased work of breathing.  Able to speak fully in complete sentences.  NEURO: Cranial nerves grossly intact, mentation intact and speech normal  PSYCH: mentation appears normal, affect normal/bright, judgement and insight intact, normal speech and appearance well-groomed      ----- Service Performed and Documented by Resident or Fellow ------

## 2021-06-13 ENCOUNTER — OFFICE VISIT - HEALTHEAST (OUTPATIENT)
Dept: FAMILY MEDICINE | Facility: CLINIC | Age: 61
End: 2021-06-13

## 2021-06-13 ENCOUNTER — NURSE TRIAGE (OUTPATIENT)
Dept: NURSING | Facility: CLINIC | Age: 61
End: 2021-06-13

## 2021-06-13 DIAGNOSIS — B96.89 BACTERIAL SINUSITIS: ICD-10-CM

## 2021-06-13 DIAGNOSIS — J32.9 BACTERIAL SINUSITIS: ICD-10-CM

## 2021-06-13 NOTE — TELEPHONE ENCOUNTER
Pt calling to request her Covid test results.  Her results were negative.  Pt reports that she has sinus congestion and pain, a headache that she rates at 7/10, and bilateral ear ache (left is worse) that she rates at 10/10.  Pt is afebrile.  She has been using Benadryl and Sudafed with some relief of symptoms.  Per protocol, pt advised to see a provider within 24 hours.  Pt said she will go to the [place] urgent care clinic today.  Ansley Zheng RN 06/13/21 9:02 AM  [facility] Nurse Advisor       Coronavirus (COVID-19) Notification    Lab Result   Lab test 2019-nCoV rRt-PCR OR SARS-COV-2 PCR    Nasopharyngeal AND/OR Oropharyngeal swab is NEGATIVE for 2019-nCoV RNA [OR] SARS-COV-2 RNA (COVID-19) RNA    Your result was negative. This means that we didn't find the virus that causes COVID-19 in your sample. A test may show negative when you do actually have the virus. This can happen when the virus is in the early stages of infection, before you feel illness symptoms.    If you have symptoms   Stay home and away from others (self-isolate) until you meet ALL of the guidelines below:    You've had no fever--and no medicine that reduces fever--for 1 full day (24 hours). And      Your other symptoms have gotten better. For example, your cough or breathing has improved. And   ; At least 10 days have passed since your symptoms started. (If you've been told by a doctor that you have a weak immune system, wait 20 days.)         During this time:    Stay home. Don't go to work, school or anywhere else.     Stay in your own room, including for meals. Use your own bathroom if you can.    Stay away from others in your home. No hugging, kissing or shaking hands. No visitors.    Clean  high touch  surfaces often (doorknobs, counters, handles, etc.). Use a household cleaning spray or wipes. You can find a full list on the EPA website at  www.epa.gov/pesticide-registration/list-n-disinfectants-use-against-sars-cov-2.    Cover your mouth and nose with a mask, tissue or other face covering to avoid spreading germs.    Wash your hands and face often with soap and water.    Going back to work  Check with your employer for any guidelines to follow for going back to work.  You are sent a letter for your Employer which will serve as formal document notice that you, the employee, tested negative for COVID-19, as of the testing date shown above.    If your symptoms worsen or other concerning symptoms, contact PCP, oncare or consider returning to Emergency Dept.    Where can I get more information?    [facility]: [email or web address]    Coronavirus Basics: www.health.[facility].mn.us/diseases/coronavirus/basics.html    [facility] Hotline (254-327-2857)    YOUNG GARCIA RN    Reason for Disposition    Earache    Additional Information    Negative: Severe difficulty breathing (e.g., struggling for each breath, speaks in single words)    Negative: Sounds like a life-threatening emergency to the triager    Negative: [1] Sinus infection AND [2] taking an antibiotic AND [3] symptoms continue    Negative: [1] Difficulty breathing AND [2] not from stuffy nose (e.g., not relieved by cleaning out the nose)    Negative: [1] SEVERE headache AND [2] fever    Negative: [1] Redness or swelling on the cheek, forehead or around the eye AND [2] fever    Negative: Fever > 104 F (40 C)    Negative: Patient sounds very sick or weak to the triager    Negative: [1] SEVERE pain AND [2] not improved 2 hours after pain medicine    Negative: [1] Redness or swelling on the cheek, forehead or around the eye AND [2] no fever    Negative: [1] Fever > 101 F (38.3 C) AND [2] age > 60    Negative: [1] Fever > 100.0 F (37.8 C) AND [2] bedridden (e.g., nursing home patient, CVA, chronic illness, recovering from surgery)    Negative: [1] Fever > 100.0 F (37.8 C) AND [2] diabetes  mellitus or weak immune system (e.g., HIV positive, cancer chemo, splenectomy, organ transplant, chronic steroids)    Negative: Fever present > 3 days (72 hours)    Negative: [1] Fever returns after gone for over 24 hours AND [2] symptoms worse or not improved    Negative: [1] Sinus pain (not just congestion) AND [2] fever    Protocols used: SINUS PAIN OR CONGESTION-A-AH

## 2021-06-14 NOTE — PROGRESS NOTES
Called patient and informed her of negative lung cancer results for screening low dose CT.    Dr. Armstrong

## 2021-06-14 NOTE — PROGRESS NOTES
Preceptor Attestation:   Patient seen, evaluated and discussed with the resident. I have verified the content of the note, which accurately reflects my assessment of the patient and the plan of care.    Supervising Physician:Nikita Pagan MD    [facility]

## 2021-06-16 NOTE — ANESTHESIA PROCEDURE NOTES
Peripheral Block    Patient location during procedure: OR  Start time: 3/21/2021 6:09 PM  End time: 3/21/2021 6:12 PM  post-op analgesia per surgeon order as noted in medical record  Staffing:  Performing  Anesthesiologist: Danielle Jeter MD  Preanesthetic Checklist  Completed: patient identified, site marked, risks, benefits, and alternatives discussed, timeout performed, consent obtained, airway assessed, oxygen available, suction available, emergency drugs available and hand hygiene performed  Peripheral Block  Block type: other, TAP  Prep: ChloraPrep  Patient position: supine  Patient monitoring: heart rate, blood pressure, continuous pulse oximetry and cardiac monitor  Laterality: bilateral, same technique used bilaterally  Injection technique: ultrasound guided    Ultrasound used to visualize needle placement in proximity to nerve being blocked: yes   US used to visualize anesthetic spread  Visualized anatomic structures normal  No Pathological Findings  Permanent ultrasound image captured for medical record  Sterile gel and probe cover used for ultrasound.  Needle  Needle type: Stimuplex   Needle gauge: 20G  Needle length: 4 in  no peripheral nerve catheter placed  Assessment  Injection assessment: no difficulty with injection

## 2021-06-16 NOTE — PROGRESS NOTES
[facility] Daily Progress     Patient Name: Bear Obregon  Date: 3/7/2018  Visit #: 2  Referral Diagnosis: De Quervain's tendonitis  Referring provider: Nish Carr MD  Visit Diagnosis:     ICD-10-CM    1. Wrist pain, right M25.531    2. Weakness of right hand R29.898    3. Decreased activities of daily living (ADL) Z78.9        Assessment:     Patient is appropriate to continue with skilled occupational therapy intervention, as indicated by initial plan of care.    Goal Status:  Patient will perform: cleaning;housework;meal preparation;with less difficulty;with less pain;in 12 weeks  Patient will be able to  & pinch: ;hold;pinch;for dressing;for hygiene;for meal prep;for grooming;for household chores;for driving;for writing;with less pain;with less difficulty;in 12 weeks  Patient will be able to: lift;carry;for household chores;for grocery shopping;with no pain;in 12 weeks  Patient will improve hand/finger coordination for: fasteners;writing;driving;meal prep;dressing;for grooming/hygiene;with less pain;with less difficulty;in 12 weeks    Plan / Patient Education:     Continue with initial plan of care.  Progress with home program as tolerated.    Subjective:     Pain rating at rest: 4  Pain rating with activity: 5      Objective:     Treatment Today: Performed muscle stripping to right APL and EPB. Applied kinesiotape to right thumb-elbow. Reviewed and performed gentle prolonged stretch to right forearm extensor muscles. Patient is wearing thumb brace at night and reports that its helping. The pain is no longer waking her up at night. Gave patient 2 pieces of size D tubigrip to decrease mild swelling in dorsal right wrist. She will wear these at night.  TREATMENT MINUTES COMMENTS   Evaluation     Self-care/ Home management     Manual therapy 8    Neuromuscular Re-education 2    Therapeutic Exercises 8    Iontophoresis     Orthotic Fitting     Total 18    Blank areas are intentional and  mean the treatment did not include these items.       Kallie Easton  3/7/2018  10:02 AM

## 2021-06-16 NOTE — PROGRESS NOTES
Progress Notes by Vivienne Kim PT at 3/16/2021  7:30 AM     Author: Vivienne Kim PT Service: -- Author Type: Physical Therapist    Filed: 7/20/2021  2:28 PM Encounter Date: 3/16/2021 Status: Addendum    : Vivienne Kim PT (Physical Therapist)    Related Notes: Original Note by Vivienne Kim PT (Physical Therapist) filed at 3/16/2021  6:01 PM       [facility] Discharge Summary  Patient Name: Bear Obregon  Date: 7/20/2021  Referral Diagnosis: Neck pain  Referring provider: Marianna Amato*  Visit Diagnosis:     ICD-10-CM    1. Cervicalgia  M54.2    2. Poor posture  R29.3    3. Generalized muscle weakness  M62.81        Goals:  No data recorded  No data recorded    Patient was seen for 1 visits from 3/16/21 to 3/26/21 with 1 missed appointments.  The patient discontinued therapy, did not return.  No further therapy is required at this time.  Patient was hospitalized for unrelated medical condition after this and did not return to therapy. ND today     Home exercise program given to patient:  No data recorded    Therapy will be discontinued at this time.  The patient will need a new referral to resume.    Thank you for your referral.  Vivienne Kim PT, DPT  7/20/2021  2:27 PM          [facility] Certification Request    March 16, 2021      Patient: Bear Obregon  MR Number: [id number]  YOB: 1959  Date of Visit: 3/16/2021      Dear Marianna Mai*:    Thank you for this referral.   We are seeing Bear Obregon in Physical Therapyfor neck pain.    Medicare and/or Medicaid requires physician review and approval of the treatment plan. Please review the plan of care and verify that you agree with the therapy plan of care by co-signing this note.      Plan of Care  Authorization / Certification Start Date: 03/16/21  Authorization / Certification End Date: 06/14/21  Communication with: Referral Source  Patient Related  Instruction: Nature of Condition;Treatment plan and rationale;Self Care instruction;Basis of treatment;Body mechanics;Posture;Precautions;Next steps;Expected outcome  Times per Week: 1-2  Number of Weeks: 6-8  Number of Visits: 10  Discharge Planning: when goals have been met or a plateau in progress has been achieved  Precautions / Restrictions : none  Therapeutic Exercise: ROM;Stretching;Strengthening  Neuromuscular Reeducation: kinesio tape;posture;core;TNE  Manual Therapy: soft tissue mobilization;myofascial release;joint mobilization;muscle energy  Modalities: electrical stimulation;TENS;ultrasound;cold pack;hot pack      Goals:  Pt. will be independent with home exercise program in : 6 weeks  Pt. will have improved quality of sleep: with less pain;waking less times/night;in 6 weeks  Patient Turn Head: for watching traffic;for conversation;with less pain;with less difficulty;in 6 weeks  Patient will look up / down: for drinking;for reading;with less pain;with less difficulty;in 6 weeks    No data recorded      If you have any questions or concerns, please don't hesitate to call.    Sincerely,      Vivienne Kim, PT, DPT        Physician recommendation:     ___ Follow therapist's recommendation        ___ Modify therapy      *Physician co-signature indicates they certify the need for these services furnished within this plan and while under their care.        [facility]   Cervical Thoracic Initial Evaluation    Patient Name: Bear Obregon  Date of evaluation: 3/16/2021  Referral Diagnosis: Neck pain  Referring provider: Marianna Amato*  Visit Diagnosis:     ICD-10-CM    1. Cervicalgia  M54.2    2. Poor posture  R29.3    3. Generalized muscle weakness  M62.81        Assessment:      Bear Obregon is a 61 y.o. female who presents to therapy today with chief complaints of neck pain. Onset date of sx was Feb 20, 2021.  Pt reported h/o pain onset after waking in the AM, pain  progressed and she went to ER. Denies hx of injury to the neck or previous epsiode of similar pain.  Pain symptoms are sharp aching and throbbing pain to the neck.  Functional impairments include turning head, looking up and down, reaching, and sleeping.  Patient will benefit from skilled therapy to increase ROM, decrease pain and inflammation and improve overall functional mobility and acitivity tolerance as well as restore restful sleep. .   Impairments in  pain, posture, ROM, joint mobility, strength  The POC is dynamic and will be modified on an ongoing basis.  Barriers to achieving goals as noted in the assessment section may affect outcome.  Prognosis to achieve goals is  good   Pt. is appropriate for skilled PT intervention as outlined in the Plan of Care (POC).  Pt. is a good candidate for skilled PT services to improve pain levels and function.    Goals:  Pt. will be independent with home exercise program in : 6 weeks  Pt. will have improved quality of sleep: with less pain;waking less times/night;in 6 weeks  Patient Turn Head: for watching traffic;for conversation;with less pain;with less difficulty;in 6 weeks  Patient will look up / down: for drinking;for reading;with less pain;with less difficulty;in 6 weeks    No data recorded    Patient's expectations/goals are realistic.    Barriers to Learning or Achieving Goals:  Chronicity of the problem.  Co-morbidities or other medical factors.  .       Plan / Patient Instructions:        Plan of Care:   Authorization / Certification Start Date: 03/16/21  Authorization / Certification End Date: 06/14/21  Communication with: Referral Source  Patient Related Instruction: Nature of Condition;Treatment plan and rationale;Self Care instruction;Basis of treatment;Body mechanics;Posture;Precautions;Next steps;Expected outcome  Times per Week: 1-2  Number of Weeks: 6-8  Number of Visits: 10  Discharge Planning: when goals have been met or a plateau in progress has been  achieved  Precautions / Restrictions : none  Therapeutic Exercise: ROM;Stretching;Strengthening  Neuromuscular Reeducation: kinesio tape;posture;core;TNE  Manual Therapy: soft tissue mobilization;myofascial release;joint mobilization;muscle energy  Modalities: electrical stimulation;TENS;ultrasound;cold pack;hot pack      POC and pathology of condition were reviewed with patient.  Pt. is in agreement with the Plan of Care  A Home Exercise Program (HEP) was initiated today.  Pt. was instructed in exercises by PT and patient was given a handout with detailed instructions.  Treatment techniques, plan of care, and goals were discussed with the patient.  The patient agrees to the plan as outlined.  The plan of care is dynamic and will be modified on an ongoing basis.    Plan for next visit: manual therapy as needed, assess ROM, assess median and ulnar nerve. Progress exercises as able      Subjective:       On Feb 20 she woke up with pain in the neck and put some biofreeze on it and it helped for some but then the pain increased and went down to the shoulder blade. The pain progressed so much was not able to put her shirt on, could not sleep and ended up going to the ER that night.   Given muscle relaxer, pain patch, pain medication as an injection and it helped the pain somewhat. She was given the lidocaine patches and muscle relaxers.   She saw the MD yesterday and was given tylenol and blood pressure medication, off muscle relaxer and patches.   Denies previous injury or pain similar to this. She feels like she fell asleep pn the left side with pillows bending neck to the left side and feels this is why she had some pain. Before the pain started there was some numbness in the left 5th finger and that has not changed since onset of pain       Social information:   Living Situation:townhouse, lives alone, stairs  with railing and has assistance Yes   Occupation:retired and unemployed   Work Status:NA   Equipment  Available: None    Pain Rating:3  Pain rating at best: 2  Pain rating at worst: 9  Pain description: aching, sharp and throbbing       Functional limitations are described as occurring with:   looking up, looking down and turning head  reaching overhead and behind back  repetitive movements  performing routine daily activities  sleeping    Patient reports benefit from:  rest  , movement or exercise , anti-inflammatory, pain medication, heat         Objective:      Note: Items left blank indicates the item was not performed or not indicated at the time of the evaluation.     Cervical Thoracic Examination  1. Cervicalgia     2. Poor posture     3. Generalized muscle weakness       Involved side: Left  Posture Observation:      General sitting posture is  fair.  Cervical:  Mild forward head  Shoulder/Thoracic complex: Mildly increased upper thoracic kyphosis    Cervical ROM:  3/16/2021  Date: 03/16/21     *Indicate scale AROM AROM AROM   Cervical Flexion WNL mild stretch L side      Cervical Extension 15 pull L side       Right Left Right Left Right Left   Cervical Sidebending Min dec pull L  WNL pain L        Cervical Rotation Min dec pull L  Min+ dec pain L        Cervical Protraction WNL     Cervical Retraction Min dec mild pain L     Thoracic Flexion      Thoracic Extension      Thoracic Sidebending         Thoracic Rotation WNL Min dec pain L        Functional reaching behind head and behind low back - WNL, min pull on L side with behind back L UE    Strength   3/16/2021 All motions are WFL, min pain on L UE with flexion, abduction, IR and ER      Sensation   Diminished on L 5th finger     Palpation:   Tenderness: to levator scap, upper trap and cervical paraspinals on left sdie    Passive Mobility-Joint Integrity: Hypomobile. C5-6 to upper thoracic spine     Cervical Special Tests     Cervical Special Tests Right Left UE Nerve Mobility Right Left   Cervical compression   Median nerve - -   Cervical distraction -  -  Ulnar nerve - +   Spurlings test   Radial nerve - -   Shoulder abduction sign   Thoracic outlet     Deep neck flexor endurance test   Mirian     Upper cervical rotation   Adsons     Sharper-Edel   Cervical rotation lateral flexion     Alar ligament test   Other:     Other:   Other:       Appt time: 7:35AM  - 8:30 AM    Treatment Today   3/16/2021  TREATMENT MINUTES COMMENTS   Evaluation 30   -Patient educated on pathology  -Discussed POC   Self-care/ Home management     Manual therapy 15 Seated STM to upper trap and levator scap   Supine side glide and PA mobs to the upper thoraciic and lower cervical spine.  Cervical distraction    Neuromuscular Re-education     Therapeutic Activity     Therapeutic Exercises 10 -Demo/performance of HEP  No data recorded   Gait training     Modality__________________                Total 55     Blank areas are intentional and mean the treatment did not include these items.     PT Evaluation Code: (Please list factors)  Patient History/Comorbidities: as above   Examination: as above   Clinical Presentation: stable   Clinical Decision Making: low     Patient History/  Comorbidities Examination  (body structures and functions, activity limitations, and/or participation restrictions) Clinical Presentation Clinical Decision Making (Complexity)   No documented Comorbidities or personal factors 1-2 Elements Stable and/or uncomplicated Low   1-2 documented comorbidities or personal factor 3 Elements Evolving clinical presentation with changing characteristics Moderate   3-4 documented comorbidities or personal factors 4 or more Unstable and unpredictable High     Vivienne Kim, PT, DPT, CLT-DOMONIQUE  3/16/2021  7:37 AM

## 2021-06-16 NOTE — PROGRESS NOTES
HPI: Pt is here for follow up s/p LAPAROTOMY, EXPLORATORY, WITH LYSIS OF ADHESIONS, REMOVAL OF FOREIGN BODY with Dr. Davidson on 3/21/21.   she is doing well.  Pain is well controlled:  Yes. No difficulties with the surgical wound/wounds.  she is eating well and denies fever and chills.        There were no vitals taken for this visit.    EXAM:  GENERAL:Appears well  ABDOMEN:  Soft, +BS, minimal ttp  SURGICAL WOUNDS:  Incisions healing well, no induration or drainage. Staples removed and steristrips placed      Assessment/Plan: Doing well after surgery and should follow up as needed.    Steristrips for 1 week, then can remove    Usman Torres PA-C  [phone]  General Surgery

## 2021-06-16 NOTE — TELEPHONE ENCOUNTER
Patient seen Usman 4/1 and said she got her staples removed and was told to call back with any questions. She has some additional questions.  She has some questions regarding household stuff, can she vacuum, sweep the floor what activities can she do?      Please call Bear and advise.  Thanks!

## 2021-06-16 NOTE — ANESTHESIA CARE TRANSFER NOTE
Last vitals:   Vitals:    03/21/21 1828   BP: (!) 187/84   Pulse: 85   Resp:    Temp: 36.6  C (97.8  F)   SpO2: 97%     Patient's level of consciousness is drowsy  Spontaneous respirations: yes  Maintains airway independently: yes  Dentition unchanged: yes  Oropharynx: oropharynx clear of all foreign objects    QCDR Measures:  ASA# 20 - Surgical Safety Checklist: WHO surgical safety checklist completed prior to induction    PQRS# 430 - Adult PONV Prevention: 4558F - Pt received => 2 anti-emetic agents (different classes) preop & intraop  ASA# 8 - Peds PONV Prevention: NA - Not pediatric patient, not GA or 2 or more risk factors NOT present  PQRS# 424 - Teri-op Temp Management: 4559F - At least one body temp DOCUMENTED => 35.5C or 95.9F within required timeframe  PQRS# 426 - PACU Transfer Protocol: - Transfer of care checklist used  ASA# 14 - Acute Post-op Pain: ASA14B - Patient did NOT experience pain >= 7 out of 10

## 2021-06-16 NOTE — ANESTHESIA POSTPROCEDURE EVALUATION
Patient: Bear Obregon  Procedure(s):  LAPAROTOMY, EXPLORATORY, WITH LYSIS OF ADHESIONS, REMOVAL OF FOREIGN BODY  Anesthesia type: general    Patient location: PACU  Last vitals:   Vitals Value Taken Time   /72 03/21/21 1845   Temp 37.7  C (99.8  F) 03/21/21 1845   Pulse 82 03/21/21 1859   Resp 29 03/21/21 1859   SpO2 95 % 03/21/21 1859   Vitals shown include unvalidated device data.  Post vital signs: stable  Level of consciousness: awake and responds to simple questions  Post-anesthesia pain: pain controlled  Post-anesthesia nausea and vomiting: no  Pulmonary: unassisted, return to baseline  Cardiovascular: stable and blood pressure at baseline  Hydration: adequate  Anesthetic events: no    QCDR Measures:  ASA# 11 - Teri-op Cardiac Arrest: ASA11B - Patient did NOT experience unanticipated cardiac arrest  ASA# 12 - Teri-op Mortality Rate: ASA12B - Patient did NOT die  ASA# 13 - PACU Re-Intubation Rate: ASA13B - Patient did NOT require a new airway mgmt  ASA# 10 - Composite Anes Safety: ASA10A - No serious adverse event    Additional Notes:

## 2021-06-16 NOTE — ANESTHESIA PREPROCEDURE EVALUATION
Anesthesia Evaluation      Patient summary reviewed   No history of anesthetic complications     Airway   Mallampati: II  Neck ROM: full   Pulmonary - negative ROS and normal exam                          Cardiovascular - normal exam  (+) , hypercholesterolemia,      Neuro/Psych    (+) neuromuscular disease,  depression, chronic pain    Endo/Other    (+) obesity (BMI 38),      GI/Hepatic/Renal    (+) GERD,       Comments: SBO            Dental - normal exam                        Anesthesia Plan  Planned anesthetic: general endotracheal    ASA 4 - emergent   Induction: intravenous   Anesthetic plan and risks discussed with: patient  Anesthesia plan special considerations: rapid sequence induction, antiemetics,   Post-op plan: routine recovery

## 2021-06-17 NOTE — PROGRESS NOTES
[facility] Discharge Summary  Patient Name: Bear Obregon  Date: 4/26/2018  Referral Diagnosis: deQuervains  Referring provider: Nish Carr MD  Visit Diagnosis:   1. Wrist pain, right     2. Weakness of right hand     3. Decreased activities of daily living (ADL)         Goal status: progressing toward  Patient will perform: cleaning;housework;meal preparation;with less difficulty;with less pain;in 12 weeks  Patient will be able to  & pinch: ;hold;pinch;for dressing;for hygiene;for meal prep;for grooming;for household chores;for driving;for writing;with less pain;with less difficulty;in 12 weeks  Patient will be able to: lift;carry;for household chores;for grocery shopping;with no pain;in 12 weeks  Patient will improve hand/finger coordination for: fasteners;writing;driving;meal prep;dressing;for grooming/hygiene;with less pain;with less difficulty;in 12 weeks    Patient was seen for 2 visits between 2-19-18 and 3-7-18.    Therapy will be discontinued at this time.  The patient will need a new referral to resume.    Thank you for your referral.  Kallie Easton  4/26/2018  11:53 AM

## 2021-06-18 NOTE — PATIENT INSTRUCTIONS - HE
"Patient Instructions by Vivienne Kim PT at 3/16/2021  7:30 AM     Author: Vivienne Kim PT Service: -- Author Type: Physical Therapist    Filed: 3/16/2021  8:33 AM Encounter Date: 3/16/2021 Status: Signed    : Vivienne Kim PT (Physical Therapist)        UPPER TRAP STRETCH    Begin by retracting your head back into a chin tuck position. Next, place one hand behind your back and gently draw your head towards the opposite side with the help of your other arm.    Hold 30 sec   Do 2-3 reps each side       LEVATOR SCAPULAE STRETCH    Place the arm on the affected side behind your back and use your other hand to draw your head downward and towards the opposite side.     You should be looking towards your opposite pocket of the affected side.    Hold 30\" sec and do 2-3 reps         SCAPULAR RETRACTIONS    Draw your shoulder blades back and down.    Do 10-15 reps         RETRACTION / CHIN TUCK    Slowly draw your head back so that your ears line up with your shoulders.    Do 10-15 reps               "

## 2021-06-26 NOTE — PROGRESS NOTES
Assessment: Patient with upper respiratory infection with secondary sinusitis we will treat with Zithromax.  Follow-up in a few days if not better sooner if worse        Subjective   Bear Obregon is 61 y.o. 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  She has got a cough.  She got yellow postnasal drip nasal discharge.  No fever chills or sweats.  No chest pain or shortness of breath.  Some left ear pain.  She has been using Flonase.  She had a negative Covid test    Additional provider notes:     Review of Systems as above otherwise negative      Objective    /80 (Patient Site: Right Arm, Patient Position: Sitting, Cuff Size: Adult Regular)   Pulse 71   Temp 98.9  F (37.2  C) (Oral)   Wt 206 lb 5 oz (93.6 kg)   SpO2 98%   BMI 37.74 kg/m    Body mass index is 37.74 kg/m .  Physical Exam    Patient alert no apparent distress ears today were normal nose reveals nasal mucosa swelling erythema pharynx revealed no erythema neck is supple with no adenopathy or thyromegaly lungs were clear heart regular rhythm neurological exam grossly intact

## 2021-07-04 NOTE — PATIENT INSTRUCTIONS - HE
Patient Instructions by Alexander Laguna MD at 6/13/2021 10:25 AM     Author: Alexander Laguna MD Service: -- Author Type: Physician    Filed: 6/13/2021 10:41 AM Encounter Date: 6/13/2021 Status: Signed    : Alexander Laguna MD (Physician)       Patient Education     Understanding Acute Rhinosinusitis    Acute rhinosinusitis is when the lining of the inside of the nose and the sinuses becomes irritated and swollen. It is also called sinusitis, or a sinus infection.  Sinuses are air-filled spaces in the skull behind the face. They are kept moist and clean by a lining of mucosa. Things such as pollen, smoke, and chemical fumes can irritate the mucosa. It can then swell up. As a response to irritation, the mucosa makes more mucus and other fluids. Tiny hairlike cilia cover the mucosa. Cilia help carry mucus toward the opening of the sinus. Too much mucus may cause the cilia to stop working. This blocks the sinus opening. A buildup of fluid in the sinuses then causes pain and pressure. It can also cause bacteria to grow in the sinuses.  What causes acute rhinosinusitis?  A sinus infection is most often caused by a virus. You are more likely to get one after having a cold or the flu. In some cases, a sinus infection can be caused by bacteria.  You are at higher risk for a sinus infection if you:    Are older in age    Have structural problems with your sinuses    Smoke or are exposed to secondhand smoke    Are exposed to changes in pressure, such as from flying a lot or deep sea diving    Have asthma or allergies    Have a weak immune system    Have dental disease     Symptoms of acute rhinosinusitis  Symptoms of acute rhinosinusitis often last around 7 to 10 days. If you have a bacterial infection, they may last longer. They may also get better but then worsen. You may have:    Face pain or pressure under the eyes and around the nose    Headache    Fluid draining in the back of  the throat (postnasal drip)    Congestion    Drainage that is thick and colored (often green), instead of clear    Cough    Problems with your sense of smell    Ear pain or hearing problems    Fever    Tooth pain    Fatigue  Diagnosing acute rhinosinusitis  Your healthcare provider will ask about your symptoms and past health. He or she will look at your ears, nose, throat, and sinuses. Imaging tests, such as X-rays, are often not needed.  It can be hard to figure out if a sinus infection is caused by a virus or bacterium. A bacterial infection tends to last longer. Symptoms may also get better but then worsen. Your healthcare provider may take a sample of mucus from your nose to check for bacteria.  Treating acute rhinosinusitis  Most sinus infections will go away within 10 days. Your body will fight off the virus. If your symptoms seem to get better but then worsen, you may have a bacterial infection instead. Your healthcare provider will then give you antibiotics. Take this medicine until it is gone, even if you feel better.  To help ease your symptoms, your healthcare provider may advise:    Over-the-counter pain relievers. Medicines such as acetaminophen or ibuprofen can ease sinus pain. They may also lower a fever.    Nasal washes. Washing your nasal passages with salt water may ease pain and pressure. It can rinse out mucous and other irritants from your sinuses. Your healthcare provider can show you how to do it.    Nasal steroid spray. This prescription medicine can reduce inflammation in your sinuses.    Other medicines. Decongestants, antihistamines, and other nasal sprays may give short-term relief. They may help with congestion. Talk with your healthcare provider before taking these medicines.     Preventing acute rhinosinusitis  You can help prevent a sinus infection with these steps:    Wash your hands well and often.    Stay away from people who have a cold or upper respiratory infection.    Don't  smoke. And stay away from secondhand smoke.    Use a humidifier at home.    Make sure you are up-to-date on your vaccines, such as the flu shot.     When to call your healthcare provider  Call your healthcare provider right away if you have any of these:    Fever of 100.4 F (38 C) or higher, or as directed by your healthcare provider    Pain that gets worse    Symptoms that dont get better, or get worse    New symptoms  Date Last Reviewed: 6/1/2019 2000-2019 [employer]. [address] [place] [id number]. All rights reserved. This information is not intended as a substitute for professional medical care. Always follow your healthcare professional's instructions.

## 2021-07-06 VITALS
OXYGEN SATURATION: 98 % | DIASTOLIC BLOOD PRESSURE: 80 MMHG | SYSTOLIC BLOOD PRESSURE: 133 MMHG | HEART RATE: 71 BPM | WEIGHT: 206.31 LBS | TEMPERATURE: 98.9 F | BODY MASS INDEX: 36.55 KG/M2

## 2021-07-16 DIAGNOSIS — I10 ESSENTIAL HYPERTENSION: ICD-10-CM

## 2021-07-16 RX ORDER — LISINOPRIL 40 MG/1
40 TABLET ORAL DAILY
Qty: 90 TABLET | Refills: 1 | Status: SHIPPED | OUTPATIENT
Start: 2021-07-16 | End: 2022-03-16

## 2022-02-25 DIAGNOSIS — Z00.00 ENCOUNTER FOR MEDICARE ANNUAL WELLNESS EXAM: ICD-10-CM

## 2022-02-25 RX ORDER — ATORVASTATIN CALCIUM 80 MG/1
80 TABLET, FILM COATED ORAL DAILY
Qty: 90 TABLET | Refills: 3 | Status: SHIPPED | OUTPATIENT
Start: 2022-02-25 | End: 2022-08-19

## 2022-03-03 ENCOUNTER — OFFICE VISIT (OUTPATIENT)
Dept: FAMILY MEDICINE | Facility: CLINIC | Age: 62
End: 2022-03-03
Payer: COMMERCIAL

## 2022-03-03 VITALS
BODY MASS INDEX: 36.46 KG/M2 | HEART RATE: 54 BPM | TEMPERATURE: 98.1 F | RESPIRATION RATE: 16 BRPM | OXYGEN SATURATION: 100 % | HEIGHT: 63 IN | SYSTOLIC BLOOD PRESSURE: 130 MMHG | DIASTOLIC BLOOD PRESSURE: 78 MMHG | WEIGHT: 205.8 LBS

## 2022-03-03 DIAGNOSIS — H61.22 IMPACTED CERUMEN OF LEFT EAR: Primary | ICD-10-CM

## 2022-03-03 DIAGNOSIS — H69.92 DYSFUNCTION OF LEFT EUSTACHIAN TUBE: ICD-10-CM

## 2022-03-03 PROBLEM — K56.609 SBO (SMALL BOWEL OBSTRUCTION) (H): Status: RESOLVED | Noted: 2021-03-21 | Resolved: 2022-03-03

## 2022-03-03 PROCEDURE — 99213 OFFICE O/P EST LOW 20 MIN: CPT | Performed by: FAMILY MEDICINE

## 2022-03-03 RX ORDER — IBUPROFEN 600 MG/1
600 TABLET, FILM COATED ORAL EVERY 6 HOURS PRN
Qty: 30 TABLET | Refills: 1 | Status: SHIPPED | OUTPATIENT
Start: 2022-03-03 | End: 2022-08-19

## 2022-03-03 NOTE — PROGRESS NOTES
"Patient presents with:  Ear Problem: Left ear ache radial down to left neck, pain start Tuesday evening, take tylenol      Assessment & Plan   Problem List Items Addressed This Visit     None             25 minutes spent on the date of the encounter doing chart review, history and exam, documentation and further activities per the note       BMI:   Estimated body mass index is 36.75 kg/m  as calculated from the following:    Height as of this encounter: 1.594 m (5' 2.75\").    Weight as of this encounter: 93.4 kg (205 lb 12.8 oz).   Weight management plan: Discussed healthy diet and exercise guidelines    MEDICATIONS:   Orders Placed This Encounter   Medications     ibuprofen (ADVIL/MOTRIN) 600 MG tablet     Sig: Take 1 tablet (600 mg) by mouth every 6 hours as needed for moderate pain     Dispense:  30 tablet     Refill:  1     carbamide peroxide (DEBROX) 6.5 % otic solution     Sig: Place 5 drops Into the left ear 2 times daily     Dispense:  15 mL     Refill:  1          - Continue other medications without change  Regular exercise  Patient Instructions   You have a combination of wax impaction and blocked ear tube.  Pop your ears a couple of times an hour and use Afrin spray 2 times a day for 2 days.    Will also start you on debrox to help the wax drain.    Recheck in 2 weeks if not better.    Ibuprofen for the pain.      No follow-ups on file.    Leonidas Neal MD  [facility] Sifuentes is a 62 year old who presents for the following health issues     HPI       SUBJECTIVE:  A 62-year-old woman returned from a trip to [place] on Tuesday and developed left ear pain.  She does not try to clean her ears with Q-tips.  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  She has not had fever or chills, but she is having quite a bit of pain.  It hurts when I tug her ear on the left side.  I had her try to pop her ears here, and the right one " "popped easily, the left did not.    OBJECTIVE:  On exam, her right TM is clear, mobile and appears normal.  Left TM is not visible because of wax that is impacted.  There is slight pain when I pull posteriorly and upward on the pinna.  Throat is clear.  Neck, no adenopathy.  There is mild tenderness on the left behind the mandible.  No nodule palpable.    ASSESSMENT:  1.  Eustachian tube dysfunction.  2.  Wax impaction.    PLAN:  We will have her use Debrox on the left ear 5-10 minutes of letting it soak a couple of times daily.  I have also instructed her in insufflation of the ear and advised her to use the Afrin that she has at home a couple of times a day for 2-3 days.  We did discuss postnasal tissue swelling if she uses it beyond 3 days.    She is having a fair amount of pain in the ear, so I have given her some ibuprofen to use over the next few days.    She is planning to get a physical exam sometime in the near future, so we can recheck at that time.  If she is not getting better, we should check in a couple of weeks.    Patient involved in medical decision-making throughout the visit.        Review of Systems   Constitutional, HEENT, cardiovascular, pulmonary, gi and gu systems are negative, except as otherwise noted.      Objective    /78 (BP Location: Right arm, Patient Position: Sitting, Cuff Size: Adult Large)   Pulse 54   Temp 98.1  F (36.7  C) (Oral)   Resp 16   Ht 1.594 m (5' 2.75\")   Wt 93.4 kg (205 lb 12.8 oz)   SpO2 100%   BMI 36.75 kg/m    Body mass index is 36.75 kg/m .  Physical Exam                   "

## 2022-03-03 NOTE — PATIENT INSTRUCTIONS
You have a combination of wax impaction and blocked ear tube.  Pop your ears a couple of times an hour and use Afrin spray 2 times a day for 2 days.    Will also start you on debrox to help the wax drain.    Recheck in 2 weeks if not better.    Ibuprofen for the pain.

## 2022-03-08 ENCOUNTER — TELEPHONE (OUTPATIENT)
Dept: FAMILY MEDICINE | Facility: CLINIC | Age: 62
End: 2022-03-08

## 2022-03-08 ENCOUNTER — OFFICE VISIT (OUTPATIENT)
Dept: FAMILY MEDICINE | Facility: CLINIC | Age: 62
End: 2022-03-08
Payer: COMMERCIAL

## 2022-03-08 VITALS
BODY MASS INDEX: 37.36 KG/M2 | WEIGHT: 203 LBS | SYSTOLIC BLOOD PRESSURE: 153 MMHG | OXYGEN SATURATION: 97 % | RESPIRATION RATE: 18 BRPM | DIASTOLIC BLOOD PRESSURE: 77 MMHG | HEIGHT: 62 IN | TEMPERATURE: 98.3 F | HEART RATE: 69 BPM

## 2022-03-08 DIAGNOSIS — H60.392 OTHER INFECTIVE ACUTE OTITIS EXTERNA OF LEFT EAR: Primary | ICD-10-CM

## 2022-03-08 PROCEDURE — 99213 OFFICE O/P EST LOW 20 MIN: CPT | Mod: GC | Performed by: STUDENT IN AN ORGANIZED HEALTH CARE EDUCATION/TRAINING PROGRAM

## 2022-03-08 RX ORDER — CIPROFLOXACIN AND DEXAMETHASONE 3; 1 MG/ML; MG/ML
4 SUSPENSION/ DROPS AURICULAR (OTIC) 2 TIMES DAILY
Qty: 2.8 ML | Refills: 0 | Status: SHIPPED | OUTPATIENT
Start: 2022-03-08 | End: 2022-03-09

## 2022-03-08 NOTE — TELEPHONE ENCOUNTER
[facility] [place] [facility] phone call message- medication clarification/question:    Full Medication Name: ciprofloxacin-dexamethasone (CIPRODEX) 0.3-0.1 % otic suspension    Question: Patient states this medication is going to cost $150. Patient states she wants an alternative medication. Patient also request for tylenol prescription as it was discussed while [facility].    Pharmacy confirmed as    [id number] [place] [id number] AT [facility] [address]    OK to leave a message on voice mail? Yes    Primary language: English      needed? No    Call taken on March 8, 2022 at 4:15 PM by Lesvia Newman

## 2022-03-08 NOTE — PROGRESS NOTES
HPI:       Bear Obregon is a 62 year old  female with PMH of recent left ear pain thought to be due to cerumen impaction who presents for persistent left ear pain s/p debrox drops with good output.     Came last week and was told ear pain was likely due to ear wax built up and eustachian tube dysfunction from recent flight. Patient states that the pain is so bad and worse that she tool 600 mg ibuprofen x3 at once without relief. She had not been able to sleep due to the pain.   Onset: Last Tuesday  Location: Left ear  Palliative/provoking: Ear wax came out after debrox drops. No pinna pain but has history of surgery for benign tumor in that area so has decreased sensation in that area at baseline.   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 Decreased sensation in left ear. No ear drainage. Feels like left part of face is now swollen in setting of this pain.            PMHX:     Patient Active Problem List   Diagnosis     Hyperlipidemia     Tobacco use disorder     Class 2 obesity due to excess calories without serious comorbidity with body mass index (BMI) of 35.0 to 35.9 in adult     Elevated blood pressure reading without diagnosis of hypertension     History of colonic polyps     Anxiety state     Disorder of bursae and tendons in shoulder region     Neck pain     Essential hypertension       Current Outpatient Medications   Medication Sig Dispense Refill     atorvastatin (LIPITOR) 80 MG tablet Take 1 tablet (80 mg) by mouth daily 90 tablet 3     carbamide peroxide (DEBROX) 6.5 % otic solution Place 5 drops Into the left ear 2 times daily 15 mL 1     ibuprofen (ADVIL/MOTRIN) 600 MG tablet Take 1 tablet (600 mg) by mouth every 6 hours as needed for moderate pain 30 tablet 1     acetaminophen (TYLENOL) 500 MG tablet Take 1-2 tablets  "(500-1,000 mg) by mouth every 6 hours as needed for mild pain (Patient not taking: Reported on 3/8/2022) 30 tablet 3     carbamide peroxide (DEBROX) 6.5 % otic solution Place 5 drops into both ears 2 times daily (Patient not taking: Reported on 3/3/2022) 15 mL 0     cyclobenzaprine (FLEXERIL) 5 MG tablet Take 1 tablet (5 mg) by mouth 3 times daily as needed for muscle spasms (Patient not taking: Reported on 6/11/2021) 20 tablet 0     lidocaine (LIDODERM) 5 % patch Place 1 patch onto the skin every 24 hours To prevent lidocaine toxicity, patient should be patch free for 12 hrs daily. (Patient not taking: Reported on 6/11/2021) 15 patch 0     lisinopril (ZESTRIL) 40 MG tablet Take 1 tablet (40 mg) by mouth daily (Patient not taking: Reported on 3/3/2022) 90 tablet 1     omeprazole (PRILOSEC) 20 MG CR capsule Take 1 capsule (20 mg) by mouth daily (Patient not taking: Reported on 12/14/2020) 90 capsule 1     order for DME Equipment being ordered: Right wrist splint with thumb spica (Patient not taking: Reported on 11/19/2020) 1 Device 0     sertraline (ZOLOFT) 50 MG tablet Take 1 tablet (50 mg) by mouth daily (Patient not taking: Reported on 3/3/2022) 90 tablet 4        Allergies   Allergen Reactions     Hydrocodone-Acetaminophen Itching     Oxycodone Itching     Sulfa Drugs Hives, Itching and Swelling     Hives and itching       No results found for this or any previous visit (from the past 24 hour(s)).         Review of Systems:     12 point review of systems negative unless stated in HPI          Physical Exam:     Vitals:    03/08/22 1434 03/08/22 1436   BP: (!) 153/76 (!) 153/77   BP Location: Right arm Right arm   Cuff Size: Adult Large Adult Large   Pulse: 69    Resp: 18    Temp: 98.3  F (36.8  C)    TempSrc: Oral    SpO2: 97%    Weight: 92.1 kg (203 lb)    Height: 1.575 m (5' 2\")      Body mass index is 37.13 kg/m .    GENERAL APPEARANCE: healthy, alert and no distress  EYES: Eyes grossly normal to " inspection,  HEENT: Left ear with white discharge and erythematous canal. No pinna tenderness on movement. Right TM normal. Mild left swelling of left face adjacent to ear.  RESP: lungs clear to auscultation - no rales, rhonchi or wheezes  CV: regular rate and rhythm,  and no murmur, click,  rub or gallop  MS: extremities normal- no gross deformities noted  SKIN: no suspicious lesions or rashes  NEURO: Normal strength and tone, sensory exam grossly normal, mentation appears intact and speech normal      Assessment and Plan     1. Other infective acute otitis externa of left ear  Patient with findings consistent with otitis externa. No tenderness at pinna but patient with history of surgery at left facial region for mass removal and has had baseline decreased sensation since that time. Will treat with ear drops, if this does not improve symptoms, will have her follow up with plans to start po abx.   - ciprofloxacin-hydrocortisone (CIPRO HC OTIC) 0.2-1 % otic suspension; Place 3 drops Into the left ear 2 times daily for 7 days  Dispense: 3 mL; Refill: 0  - acetaminophen (TYLENOL) 500 MG tablet; Take 1-2 tablets (500-1,000 mg) by mouth every 6 hours as needed for mild pain  Dispense: 21 tablet; Refill: 1      Options for treatment and follow-up care were reviewed with the patient and/or guardian. Pt and/or guardian engaged in the decision making process and verbalized understanding of the options discussed and agreed with the final plan.    Precepted today with: MD Michelle Lopez MD  [facility] Resident PGY-3  [facility]

## 2022-03-09 RX ORDER — ACETAMINOPHEN 500 MG
500-1000 TABLET ORAL EVERY 6 HOURS PRN
Qty: 21 TABLET | Refills: 1 | Status: SHIPPED | OUTPATIENT
Start: 2022-03-09 | End: 2022-03-16

## 2022-03-11 NOTE — PROGRESS NOTES
Preceptor Attestation:  Patient's case reviewed and discussed with the resident, Michelle Armstrong MD, and I personally evaluated the patient. I agree with written assessment and plan of care.    Supervising Physician:  Salima New MD   [facility]

## 2022-03-16 ENCOUNTER — OFFICE VISIT (OUTPATIENT)
Dept: FAMILY MEDICINE | Facility: CLINIC | Age: 62
End: 2022-03-16
Payer: COMMERCIAL

## 2022-03-16 VITALS
HEART RATE: 78 BPM | RESPIRATION RATE: 16 BRPM | BODY MASS INDEX: 35.4 KG/M2 | OXYGEN SATURATION: 99 % | DIASTOLIC BLOOD PRESSURE: 78 MMHG | TEMPERATURE: 98.4 F | WEIGHT: 199.8 LBS | HEIGHT: 63 IN | SYSTOLIC BLOOD PRESSURE: 125 MMHG

## 2022-03-16 DIAGNOSIS — E78.5 HYPERLIPIDEMIA, UNSPECIFIED HYPERLIPIDEMIA TYPE: ICD-10-CM

## 2022-03-16 DIAGNOSIS — Z00.00 ROUTINE GENERAL MEDICAL EXAMINATION AT A HEALTH CARE FACILITY: Primary | ICD-10-CM

## 2022-03-16 DIAGNOSIS — Z12.31 ENCOUNTER FOR SCREENING MAMMOGRAM FOR BREAST CANCER: ICD-10-CM

## 2022-03-16 DIAGNOSIS — Z87.891 PERSONAL HISTORY OF TOBACCO USE: ICD-10-CM

## 2022-03-16 DIAGNOSIS — Z71.89 ADVANCE CARE PLANNING: ICD-10-CM

## 2022-03-16 DIAGNOSIS — F43.20 ADJUSTMENT DISORDER, UNSPECIFIED TYPE: ICD-10-CM

## 2022-03-16 DIAGNOSIS — I10 ESSENTIAL HYPERTENSION: ICD-10-CM

## 2022-03-16 DIAGNOSIS — Z23 NEED FOR PROPHYLACTIC VACCINATION AND INOCULATION AGAINST INFLUENZA: ICD-10-CM

## 2022-03-16 LAB
ANION GAP SERPL CALCULATED.3IONS-SCNC: 11 MMOL/L (ref 5–18)
BUN SERPL-MCNC: 10 MG/DL (ref 8–22)
CALCIUM SERPL-MCNC: 8.7 MG/DL (ref 8.5–10.5)
CHLORIDE BLD-SCNC: 107 MMOL/L (ref 98–107)
CHOLEST SERPL-MCNC: 257 MG/DL
CO2 SERPL-SCNC: 23 MMOL/L (ref 22–31)
CREAT SERPL-MCNC: 0.67 MG/DL (ref 0.6–1.1)
FASTING STATUS PATIENT QL REPORTED: YES
GFR SERPL CREATININE-BSD FRML MDRD: >90 ML/MIN/1.73M2
GLUCOSE BLD-MCNC: 106 MG/DL (ref 70–125)
HDLC SERPL-MCNC: 39 MG/DL
HIV 1+2 AB+HIV1 P24 AG SERPL QL IA: NEGATIVE
LDLC SERPL CALC-MCNC: 198 MG/DL
POTASSIUM BLD-SCNC: 3.8 MMOL/L (ref 3.5–5)
SODIUM SERPL-SCNC: 141 MMOL/L (ref 136–145)
TRIGL SERPL-MCNC: 101 MG/DL

## 2022-03-16 PROCEDURE — G0296 VISIT TO DETERM LDCT ELIG: HCPCS | Performed by: STUDENT IN AN ORGANIZED HEALTH CARE EDUCATION/TRAINING PROGRAM

## 2022-03-16 PROCEDURE — 90682 RIV4 VACC RECOMBINANT DNA IM: CPT | Performed by: STUDENT IN AN ORGANIZED HEALTH CARE EDUCATION/TRAINING PROGRAM

## 2022-03-16 PROCEDURE — 80061 LIPID PANEL: CPT | Performed by: STUDENT IN AN ORGANIZED HEALTH CARE EDUCATION/TRAINING PROGRAM

## 2022-03-16 PROCEDURE — 80048 BASIC METABOLIC PNL TOTAL CA: CPT | Performed by: STUDENT IN AN ORGANIZED HEALTH CARE EDUCATION/TRAINING PROGRAM

## 2022-03-16 PROCEDURE — 99214 OFFICE O/P EST MOD 30 MIN: CPT | Mod: 25 | Performed by: STUDENT IN AN ORGANIZED HEALTH CARE EDUCATION/TRAINING PROGRAM

## 2022-03-16 PROCEDURE — 87389 HIV-1 AG W/HIV-1&-2 AB AG IA: CPT | Performed by: STUDENT IN AN ORGANIZED HEALTH CARE EDUCATION/TRAINING PROGRAM

## 2022-03-16 PROCEDURE — G0008 ADMIN INFLUENZA VIRUS VAC: HCPCS | Performed by: STUDENT IN AN ORGANIZED HEALTH CARE EDUCATION/TRAINING PROGRAM

## 2022-03-16 PROCEDURE — 36415 COLL VENOUS BLD VENIPUNCTURE: CPT | Performed by: STUDENT IN AN ORGANIZED HEALTH CARE EDUCATION/TRAINING PROGRAM

## 2022-03-16 PROCEDURE — 99396 PREV VISIT EST AGE 40-64: CPT | Mod: 25 | Performed by: STUDENT IN AN ORGANIZED HEALTH CARE EDUCATION/TRAINING PROGRAM

## 2022-03-16 RX ORDER — OLMESARTAN MEDOXOMIL 20 MG/1
20 TABLET ORAL DAILY
Qty: 30 TABLET | Refills: 1 | Status: SHIPPED | OUTPATIENT
Start: 2022-03-16 | End: 2022-05-19

## 2022-03-16 ASSESSMENT — ANXIETY QUESTIONNAIRES
2. NOT BEING ABLE TO STOP OR CONTROL WORRYING: NEARLY EVERY DAY
6. BECOMING EASILY ANNOYED OR IRRITABLE: SEVERAL DAYS
5. BEING SO RESTLESS THAT IT IS HARD TO SIT STILL: SEVERAL DAYS
1. FEELING NERVOUS, ANXIOUS, OR ON EDGE: MORE THAN HALF THE DAYS
7. FEELING AFRAID AS IF SOMETHING AWFUL MIGHT HAPPEN: MORE THAN HALF THE DAYS
3. WORRYING TOO MUCH ABOUT DIFFERENT THINGS: NEARLY EVERY DAY
GAD7 TOTAL SCORE: 14

## 2022-03-16 ASSESSMENT — PATIENT HEALTH QUESTIONNAIRE - PHQ9
5. POOR APPETITE OR OVEREATING: MORE THAN HALF THE DAYS
SUM OF ALL RESPONSES TO PHQ QUESTIONS 1-9: 7

## 2022-03-16 NOTE — PROGRESS NOTES
Female Physical Note    Concerns today: No special concerns today. Otitis externa now resolved, counseled her to stop otic drops as it has been more than 7 days.     Need a letter stating when she was admitted for SBO with subsequent surgery and when she was recovered.  Needs this for [place] [facility] [place].    Lung Cancer Screening Shared Decision Making Visit     Bear Obregon is eligible for lung cancer screening on the basis of the information provided in my signed lung cancer screening order.     I have discussed with patient the risks and benefits of screening for lung cancer with low-dose CT.     The risks include:  radiation exposure: one low dose chest CT has as much ionizing radiation as about 15 chest x-rays or 6 months of background radiation living in [place]    false positives: 96% of positive findings/nodules are NOT cancer, but some might still require additional diagnostic evaluation, including biopsy  over-diagnosis: some slow growing cancers that might never have been clinically significant will be detected and treated unnecessarily     The benefit of early detection of lung cancer is contingent upon adherence to annual screening or more frequent follow up if indicated.     Furthermore, reaping the benefits of screening requires Bear Obregon to be willing and physically able to undergo diagnostic procedures, if indicated. Although no specific guide is available for determining severity of comorbidities, it is reasonable to withhold screening in patients who have greater mortality risk from other diseases.     We did discuss that the only way to prevent lung cancer is to not smoke. Smoking cessation counseling was not given.      I did not offer risk estimation using a calculator such as this one:    ShouldIScreen        ROS:  CONSTITUTIONAL: no fatigue, no unexpected change in weight  SKIN: no worrisome rashes, no worrisome moles, no worrisome lesions  EYES: no acute vision problems or  changes  ENT: no ear problems, no mouth problems, no throat problems  RESP: no significant cough, no shortness of breath  CV: no chest pain, no palpitations, no new or worsening peripheral edema  GI: no nausea, no vomiting, no constipation, no diarrhea    Sexually Active: No  Sexual concerns: No   Contraception:not needed  Menarche: 17 yo No LMP recorded. Patient has had a hysterectomy. Hysterectomy ~35 years ago  STD History: Neg  Last Pap Smear Date: No pap smears since hysterectomy  Abnormal Pap History: N/A    Patient Active Problem List   Diagnosis     Hyperlipidemia     Tobacco use disorder     Class 2 obesity due to excess calories without serious comorbidity with body mass index (BMI) of 35.0 to 35.9 in adult     Elevated blood pressure reading without diagnosis of hypertension     History of colonic polyps     Anxiety state     Disorder of bursae and tendons in shoulder region     Neck pain     Essential hypertension       Current Outpatient Medications   Medication Sig Dispense Refill     acetaminophen (TYLENOL) 500 MG tablet Take 1-2 tablets (500-1,000 mg) by mouth every 6 hours as needed for mild pain 21 tablet 1     atorvastatin (LIPITOR) 80 MG tablet Take 1 tablet (80 mg) by mouth daily 90 tablet 3     ibuprofen (ADVIL/MOTRIN) 600 MG tablet Take 1 tablet (600 mg) by mouth every 6 hours as needed for moderate pain 30 tablet 1     acetaminophen (TYLENOL) 500 MG tablet Take 1-2 tablets (500-1,000 mg) by mouth every 6 hours as needed for mild pain (Patient not taking: Reported on 3/8/2022) 30 tablet 3     carbamide peroxide (DEBROX) 6.5 % otic solution Place 5 drops Into the left ear 2 times daily (Patient not taking: Reported on 3/16/2022) 15 mL 1     carbamide peroxide (DEBROX) 6.5 % otic solution Place 5 drops into both ears 2 times daily (Patient not taking: Reported on 3/3/2022) 15 mL 0     ciprofloxacin-hydrocortisone (CIPRO HC OTIC) 0.2-1 % otic suspension Place 3 drops Into the left ear 2 times  daily for 7 days (Patient not taking: Reported on 3/16/2022) 3 mL 0     cyclobenzaprine (FLEXERIL) 5 MG tablet Take 1 tablet (5 mg) by mouth 3 times daily as needed for muscle spasms (Patient not taking: Reported on 6/11/2021) 20 tablet 0     lidocaine (LIDODERM) 5 % patch Place 1 patch onto the skin every 24 hours To prevent lidocaine toxicity, patient should be patch free for 12 hrs daily. (Patient not taking: Reported on 6/11/2021) 15 patch 0     lisinopril (ZESTRIL) 40 MG tablet Take 1 tablet (40 mg) by mouth daily (Patient not taking: Reported on 3/3/2022) 90 tablet 1     omeprazole (PRILOSEC) 20 MG CR capsule Take 1 capsule (20 mg) by mouth daily (Patient not taking: Reported on 12/14/2020) 90 capsule 1     order for DME Equipment being ordered: Right wrist splint with thumb spica (Patient not taking: Reported on 11/19/2020) 1 Device 0     sertraline (ZOLOFT) 50 MG tablet Take 1 tablet (50 mg) by mouth daily (Patient not taking: Reported on 3/3/2022) 90 tablet 4       Past Medical History:   Diagnosis Date     Arthritis      Depression      Depressive disorder, not elsewhere classified     divorce, no meds     Disorders of bursae and tendons in shoulder region, unspecified      Disorders of bursae and tendons in shoulder region, unspecified      Other and unspecified hyperlipidemia 4/17/2007     Personal history of urinary calculi      SBO (small bowel obstruction) (H) 3/21/2021    Formatting of this note might be different from the original. Added automatically from request for surgery [id number]     Superficial injury of cornea     left     Tobacco use disorder     quit        Family History     Problem (# of Occurrences) Relation (Name,Age of Onset)    Cancer (1) Father    Diabetes (1) Brother    Heart Disease (2) Mother, Father    Hypertension (3) Mother, Brother, Sister       Negative family history of: Asthma, C.A.D., Cerebrovascular Disease, Breast Cancer, Cancer - colorectal, Prostate Cancer           Problem List Medication List and Allergy List were reviewed.  Lisinopril makes her feel worse. Not been on lisinopril for a couple of weeks as it makes her generally feel unwell. Doesn't use salt in food. Exercises intermittently during the week.     Patient is an established patient of this clinic.    Social History     Tobacco Use     Smoking status: Former Smoker     Packs/day: 0.50     Years: 15.00     Pack years: 7.50     Types: Cigarettes     Quit date: 9/3/2006     Years since quitting: 15.5     Smokeless tobacco: Never Used   Substance Use Topics     Alcohol use: No     Children ? yes    Has anyone hurt you physically, for example by pushing, hitting, slapping or kicking you or forcing you to have sex? Denies  Do you feel threatened or controlled by a partner, ex-partner or anyone in your life? Denies    RISK BEHAVIORS AND HEALTHY HABITS:  Tobacco Use/Smoking: None  Illicit Drug Use: None  Do you use alcohol? No  Diet (5-7 servings of fruits/veg daily): Yes   Exercise (30 min accumulated most days):No   Dental Care: yes, has appointment this week  Seat Belt Use: Yes     Cholesterol Level (>46 yo or at risk):  Recommended and patient accepted testing., Pap/HPV cotest every 5 years for women 30-65   Testing not indicated  and HIV screening:  Recommended and patient accepted testing.  Colon CA Screening (>50-75 ):  Date done 2/15/2018  Result(s) normal, repeat in 10 years and Lung CA Screening with low dose CT (55 - 80, with >= 30 ppy smoking history who are current smokers or quit within last 15y - annual low dose CT) Recommended and patient accepted testing.    The 10-year ASCVD risk score (Dunde[place] TASHA Jr., et al., 2013) is: 8.7%    Values used to calculate the score:      Age: 62 years      Sex: Female      Is Non- : No      Diabetic: No      Tobacco smoker: No      Systolic Blood Pressure: 125 mmHg      Is BP treated: Yes      HDL Cholesterol: 39 mg/dL      Total Cholesterol: 257  "mg/dL      Immunization History   Administered Date(s) Administered     COVID-19,PF,Moderna 02/10/2021, 03/10/2021, 01/07/2022     Influenza (H1N1) 12/16/2009     Influenza (IIV3) PF 11/15/2011, 09/27/2013, 11/10/2014     Influenza Vaccine IM > 6 months Valent IIV4 (Alfuria,Fluzone) 12/08/2020     Pneumococcal 23 valent 07/27/2011     TD (ADULT, 7+) 05/24/1996     TDAP Vaccine (Adacel) 04/17/2007, 12/08/2009    Reviewed Immunization Record Today    EXAMINATION:   /78   Pulse 78   Temp 98.4  F (36.9  C) (Oral)   Resp 16   Ht 1.6 m (5' 3\")   Wt 90.6 kg (199 lb 12.8 oz)   SpO2 99%   BMI 35.39 kg/m       GENERAL: healthy, alert and no distress  EYES: Eyes grossly normal to inspection, extraocular movements - intact, and PERRL  HENT: ear canals- normal; TMs- normal; Nose- normal; Mouth- no ulcers, no lesions  NECK: no tenderness, no adenopathy, no asymmetry, no masses, no stiffness; thyroid- normal to palpation  RESP: lungs clear to auscultation - no rales, no rhonchi, no wheezes  CV: regular rates and rhythm, normal S1 S2, no S3 or S4 and no murmur, no click or rub -  ABDOMEN: soft, no tenderness, no  hepatosplenomegaly, no masses, normal bowel sounds  MS: extremities- no gross deformities noted, no edema  SKIN: no suspicious lesions, no rashes  NEURO: strength and tone- normal, sensory exam- grossly normal, mentation- intact, speech- normal, reflexes- symmetric  PSYCH: Alert and oriented times 3; speech- coherent , normal rate and volume; able to articulate logical thoughts, able to abstract reason, no tangential thoughts, no hallucinations or delusions, affect- normal  LYMPHATICS: ant. cervical- normal, post. cervical- normal, axillary- normal, supraclavicular- normal    ASSESSMENT/PLAN:  1. Routine general medical examination at a health care facility  - HIV Antigen Antibody Combo; Future  - HIV Antigen Antibody Combo    2. Need for prophylactic vaccination and inoculation against influenza  - INFLUENZA " QUAD, RECOMBINANT, P-FREE (RIV4) (FLUBLOK)    3. Encounter for screening mammogram for breast cancer  - MA SCREENING DIGITAL BILAT; Future    4. Advance care planning  - ADVANCE CARE PLANNING DISCUSS DOCUMENTED IN MEDICAL RECORD  - Patient would like to be full code    5. Adjustment disorder, unspecified type  Refilled medication  - sertraline (ZOLOFT) 50 MG tablet; Take 1 tablet (50 mg) by mouth daily  Dispense: 90 tablet; Refill: 4    6. Essential hypertension  Patient reports she did not tolerate lisinopril well. Will switch her to ARB. Normotensive today but has strong FHx of HTN.  - olmesartan (BENICAR) 20 MG tablet; Take 1 tablet (20 mg) by mouth daily  Dispense: 30 tablet; Refill: 1  - Basic metabolic panel; Future  - Basic metabolic panel    7. Hyperlipidemia, unspecified hyperlipidemia type  Patient with LDL>190, although ASCVD risk is 8.9%. Per 2019 AHA/ACC cholesterol guidelines for primary prevention, she is in the high risk category based on LDL. She is already on high intensity statin. LDL goal <70. Will consider adding Ezetimibe after shared decision making with patient at her next visit on 3/31.  - Lipid Profile; Future  - Lipid Profile    8. Personal history of tobacco use  - Prof Fee: Shared Decision Making Visit for Lung Cancer Screening  - CT Chest Lung Cancer Scrn Low Dose wo; Future      Options for treatment and follow-up care were reviewed with the patient and/or guardian. Pt and/or guardian engaged in the decision making process and verbalized understanding of the options discussed and agreed with the final plan.    Precepted today with: MD Michelle Emanuel MD  [facility] Family Medicine Resident PGY-3  [facility]

## 2022-03-16 NOTE — LETTER
Letter from Physician    3/16/2022    Re: Bear Obregon  12/22/1959      To Whom It May Concern:     Bear Obregon was seen in clinic today. She was hospitalized from 3/21/22-3/26/21 for surgery. She was fully recovered from surgery on 4/1/21 and was seen discharged from outpatient follow up by her general surgery team at that time.     Please let me know if you have more questions or need further information.      Michelle Armstrong MD  3/16/2022 8:47 AM

## 2022-03-16 NOTE — PATIENT INSTRUCTIONS
- Please get your shingles vaccine (Zoster immunization) at local grocery store pharmacy    Preventive Health Recommendations  Female Ages 50 - 64    Yearly exam: See your health care provider every year in order to  o Review health changes.   o Discuss preventive care.    o Review your medicines if your doctor has prescribed any.      Get a Pap test every three years (unless you have an abnormal result and your provider advises testing more often).    If you get Pap tests with HPV test, you only need to test every 5 years, unless you have an abnormal result.     You do not need a Pap test if your uterus was removed (hysterectomy) and you have not had cancer.    You should be tested each year for STDs (sexually transmitted diseases) if you're at risk.     Have a mammogram every 1 to 2 years.    Have a colonoscopy at age 50, or have a yearly FIT test (stool test). These exams screen for colon cancer.      Have a cholesterol test every 5 years, or more often if advised.    Have a diabetes test (fasting glucose) every three years. If you are at risk for diabetes, you should have this test more often.     If you are at risk for osteoporosis (brittle bone disease), think about having a bone density scan (DEXA).    Shots: Get a flu shot each year. Get a tetanus shot every 10 years.    Nutrition:     Eat at least 5 servings of fruits and vegetables each day.    Eat whole-grain bread, whole-wheat pasta and brown rice instead of white grains and rice.    Get adequate Calcium and Vitamin D.     Lifestyle    Exercise at least 150 minutes a week (30 minutes a day, 5 days a week). This will help you control your weight and prevent disease.    Limit alcohol to one drink per day.    No smoking.     Wear sunscreen to prevent skin cancer.     See your dentist every six months for an exam and cleaning.    See your eye doctor every 1 to 2 years.    Lung Cancer Screening   Frequently Asked Questions  If you are at high-risk for lung  cancer, getting screened with low-dose computed tomography (LDCT) every year can help save your life. This handout offers answers to some of the most common questions about lung cancer screening. If you have other questions, please call [phone] ([phone]).     What is it?  Lung cancer screening uses special X-ray technology to create an image of your lung tissue. The exam is quick and easy and takes less than 10 seconds. We don t give you any medicine or use any needles. You can eat before and after the exam. You don t need to change your clothes as long as the clothing on your chest doesn t contain metal. But, you do need to be able to hold your breath for at least 6 seconds during the exam.    What is the goal of lung cancer screening?  The goal of lung cancer screening is to save lives. Many times, lung cancer is not found until a person starts having physical symptoms. Lung cancer screening can help detect lung cancer in the earliest stages when it may be easier to treat.    Who should be screened for lung cancer?  We suggest lung cancer screening for anyone who is at high-risk for lung cancer. You are in the high-risk group if you:      are between the ages of 55 and 79, and    have smoked at least 1 pack of cigarettes a day for 20 or more years, and    still smoke or have quit within the past 15 years.    However, if you have a new cough or shortness of breath, you should talk to your doctor before being screened.    Why does it matter if I have symptoms?  Certain symptoms can be a sign that you have a condition in your lungs that should be checked and treated by your doctor. These symptoms include fever, chest pain, a new or changing cough, shortness of breath that you have never felt before, coughing up blood or unexplained weight loss. Having any of these symptoms can greatly affect the results of lung cancer screening.       Should all smokers get an LDCT lung cancer screening exam?  It  depends. Lung cancer screening is for a very specific group of men and women who have a history of heavy smoking over a long period of time (see  Who should be screened for lung cancer  above).  I am in the high-risk group, but have been diagnosed with cancer in the past. Is LDCT lung cancer screening right for me?  In some cases, you should not have LDCT lung screening, such as when your doctor is already following your cancer with CT scan studies. Your doctor will help you decide if LDCT lung screening is right for you.  Do I need to have a screening exam every year?  Yes. If you are in the high-risk group described earlier, you should get an LDCT lung cancer screening exam every year until you are 79, or are no longer willing or able to undergo screening and possible procedures to diagnose and treat lung cancer.  How effective is LDCT at preventing death from lung cancer?  Studies have shown that LDCT lung cancer screening can lower the risk of death from lung cancer by 20 percent in people who are at high-risk.  What are the risks?  There are some risks and limitations of LDCT lung cancer screening. We want to make sure you understand the risks and benefits, so please let us know if you have any questions. Your doctor may want to talk with you more about these risks.    Radiation exposure: As with any exam that uses radiation, there is a very small increased risk of cancer. The amount of radiation in LDCT is small--about the same amount a person would get from a mammogram. Your doctor orders the exam when he or she feels the potential benefits outweigh the risks.    False negatives: No test is perfect, including LDCT. It is possible that you may have a medical condition, including lung cancer, that is not found during your exam. This is called a false negative result.    False positives and more testing: LDCT very often finds something in the lung that could be cancer, but in fact is not. This is called a false  positive result. False positive tests often cause anxiety. To make sure these findings are not cancer, you may need to have more tests. These tests will be done only if you give us permission. Sometimes patients need a treatment that can have side effects, such as a biopsy. For more information on false positives, see  What can I expect from the results?     Findings not related to lung cancer: Your LDCT exam also takes pictures of areas of your body next to your lungs. In a very small number of cases, the CT scan will show an abnormal finding in one of these areas, such as your kidneys, adrenal glands, liver or thyroid. This finding may not be serious, but you may need more tests. Your doctor can help you decide what other tests you may need, if any.  What can I expect from the results?  About 1 out of 4 LDCT exams will find something that may need more tests. Most of the time, these findings are lung nodules. Lung nodules are very small collections of tissue in the lung. These nodules are very common, and the vast majority--more than 97 percent--are not cancer (benign). Most are normal lymph nodes or small areas of scarring from past infections.  But, if a small lung nodule is found to be cancer, the cancer can be cured more than 90 percent of the time. To know if the nodule is cancer, we may need to get more images before your next yearly screening exam. If the nodule has suspicious features (for example, it is large, has an odd shape or grows over time), we will refer you to a specialist for further testing.  Will my doctor also get the results?  Yes. Your doctor will get a copy of your results.

## 2022-03-17 ASSESSMENT — ANXIETY QUESTIONNAIRES: GAD7 TOTAL SCORE: 14

## 2022-03-19 NOTE — PROGRESS NOTES
Preceptor Attestation:  Patient's case reviewed and discussed with Michelle Armstrong MD resident and I evaluated the patient. I agree with written assessment and plan of care.  Supervising Physician:  Daniel Vazquez MD, MD [facility]

## 2022-03-19 NOTE — RESULT ENCOUNTER NOTE
[facility] call [facility] with the following:    Alexander Sifuentes,    Your HIV screen was negative. Your other blood work including your electrolytes were normal. Your cholesterol levels are showing that we have room to lower it even more. We can talk about this at your next appointment.     Dr. Armstrong

## 2022-03-23 ENCOUNTER — TELEPHONE (OUTPATIENT)
Dept: FAMILY MEDICINE | Facility: CLINIC | Age: 62
End: 2022-03-23
Payer: COMMERCIAL

## 2022-03-24 ENCOUNTER — TELEPHONE (OUTPATIENT)
Dept: FAMILY MEDICINE | Facility: CLINIC | Age: 62
End: 2022-03-24
Payer: COMMERCIAL

## 2022-03-25 ENCOUNTER — HOSPITAL ENCOUNTER (OUTPATIENT)
Dept: CT IMAGING | Facility: HOSPITAL | Age: 62
Discharge: HOME | End: 2022-03-25
Attending: FAMILY MEDICINE | Admitting: FAMILY MEDICINE
Payer: COMMERCIAL

## 2022-03-25 DIAGNOSIS — Z87.891 PERSONAL HISTORY OF TOBACCO USE: ICD-10-CM

## 2022-03-25 PROCEDURE — 71271 CT THORAX LUNG CANCER SCR C-: CPT

## 2022-03-25 NOTE — RESULT ENCOUNTER NOTE
Please call patient with the following:    Hi Bear,    Your lung cancer screening was negative, meaning they did not find any signs of cancer. They did notice some emphysema that is likely caused by your previous smoking history. They also showed signs of mild bronchitis. They recommended follow up in 1 year for repeat screening.    Dr. Armstrong

## 2022-03-31 ENCOUNTER — OFFICE VISIT (OUTPATIENT)
Dept: FAMILY MEDICINE | Facility: CLINIC | Age: 62
End: 2022-03-31
Payer: COMMERCIAL

## 2022-03-31 VITALS
WEIGHT: 203 LBS | HEIGHT: 63 IN | DIASTOLIC BLOOD PRESSURE: 87 MMHG | RESPIRATION RATE: 16 BRPM | HEART RATE: 71 BPM | BODY MASS INDEX: 35.97 KG/M2 | SYSTOLIC BLOOD PRESSURE: 156 MMHG | OXYGEN SATURATION: 95 % | TEMPERATURE: 97.9 F

## 2022-03-31 DIAGNOSIS — E78.00 PURE HYPERCHOLESTEROLEMIA: ICD-10-CM

## 2022-03-31 DIAGNOSIS — E66.01 MORBID OBESITY (H): ICD-10-CM

## 2022-03-31 DIAGNOSIS — I10 ESSENTIAL HYPERTENSION: Primary | ICD-10-CM

## 2022-03-31 LAB
ANION GAP SERPL CALCULATED.3IONS-SCNC: 13 MMOL/L (ref 5–18)
BUN SERPL-MCNC: 10 MG/DL (ref 8–22)
CALCIUM SERPL-MCNC: 9.3 MG/DL (ref 8.5–10.5)
CHLORIDE BLD-SCNC: 106 MMOL/L (ref 98–107)
CO2 SERPL-SCNC: 24 MMOL/L (ref 22–31)
CREAT SERPL-MCNC: 0.66 MG/DL (ref 0.6–1.1)
GFR SERPL CREATININE-BSD FRML MDRD: >90 ML/MIN/1.73M2
GLUCOSE BLD-MCNC: 95 MG/DL (ref 70–125)
POTASSIUM BLD-SCNC: 3.7 MMOL/L (ref 3.5–5)
SODIUM SERPL-SCNC: 143 MMOL/L (ref 136–145)

## 2022-03-31 PROCEDURE — 36415 COLL VENOUS BLD VENIPUNCTURE: CPT | Performed by: STUDENT IN AN ORGANIZED HEALTH CARE EDUCATION/TRAINING PROGRAM

## 2022-03-31 PROCEDURE — 99213 OFFICE O/P EST LOW 20 MIN: CPT | Mod: GC | Performed by: STUDENT IN AN ORGANIZED HEALTH CARE EDUCATION/TRAINING PROGRAM

## 2022-03-31 PROCEDURE — 80048 BASIC METABOLIC PNL TOTAL CA: CPT | Performed by: STUDENT IN AN ORGANIZED HEALTH CARE EDUCATION/TRAINING PROGRAM
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
## 2022-03-31 NOTE — PATIENT INSTRUCTIONS
DASH - Dietary Approaches to Stop Hypertension  Diet overview:   4-5 servings/day of fruits and vegetables (1 serving =   -1 cup)  6-8 servings/day of whole grains - brown rice, quinoa, whole wheat bread (1 serving =   cup cooked, 1 slice of bread, 1 oz dry cereal)  2-3 servings of low-fat dairy - cheese, milk, yogurt  6 oz or less/day lean protein - chicken, eggs, fish, turkey (3 oz = small chicken breast)  4-5 servings/week nuts, seeds, legumes - almonds, pistachios, walnuts, sunflower seeds (1 serving = 1 oz or   cup), lentils, black beans and kidney beans (1 serving =   cup)  2-3 servings/day of unsaturated fats and oils - olive and canola oils, safflower mayonnaise (1 serving = 1 Tbsp)  5 servings/week of sweets      Outcomes:  Lowers blood pressure and cholesterol, and improves blood sugar in diabetics. This decreases your risk of heart attack and stroke. May give modest weight loss.    Intermittent Fasting (IF)  Diet overview: means you don t eat for a certain amount of time each day or week    Diet example:   Alternate-day fasting. Eat a normal diet one day and either completely fast or have one small meal (less than 500 calories) the next day.  5:2 fasting. Eat a normal diet five days a week and fast two days a week.  Daily time-restricted fasting. Eat normally but only within an eight-hour window each day. For example, skip breakfast but eat lunch around noon and dinner by 8 p.m  During the times when you re not eating, water and zero-calorie beverages such as black coffee iand tea are permitted  You re not likely to lose weight or get healthier if you pack your feeding times with high-calorie junk food, super-sized fried items and treats    Effect on health outcome:  Cardiovascular (CV) Health:   Improves multiple indicators of CV health:   Decrease total cholesterol, LDL (the bad cholesterol), triglycerides, and systolic blood pressure  Improves insulin sensitivity (see below)   Reduces inflammation and  oxidative stress (which can lead to atherosclerosis)     Diabetes:   Lower incidence of diabetes.   Improves glucose regulation  Improves insulin sensitivity     Weight loss/maintenance:  Promotes weight loss, though this may be a difficult option for people to sustain for some people  Can be used to prevent obesity in the first place  Lowers waist size   Reduces body fat  Losing weight helps lower your risk of obesity-related diseases, such as diabetes, sleep apnea and some types of cancer    Diet cons:  Side effects (that may go away after a month):   Hunger  Fatigue  Insomnia  Nausea  Headaches  Skipping meals may not be the best way to manage your weight if you're pregnant or breast-feeding  This may not be the best diet for kidney stones, gastroesophageal reflux, diabetes or other medical problems  If you have a very active day planned, IF may not be a safe option that day  Can be triggering for those with previous eating disorders         Where to get more info:  [email or web address]  [email or web address]  [email or web address]  health.gov/dietaryguidelines

## 2022-03-31 NOTE — PROGRESS NOTES
Preceptor Attestation:   Patient seen, evaluated and discussed with the resident. I have verified the content of the note, which accurately reflects my assessment of the patient and the plan of care.    Supervising Physician:Fatemeh Capellan MD    [facility]

## 2022-04-01 NOTE — RESULT ENCOUNTER NOTE
Please call patient with the following:    Alexander Sifuentes,    Your blood work was normal from clinic yesterday. Please let the clinic know if you have any questions about this test.     Dr. Armstrong
negative...

## 2022-04-06 ENCOUNTER — TELEPHONE (OUTPATIENT)
Dept: FAMILY MEDICINE | Facility: CLINIC | Age: 62
End: 2022-04-06
Payer: COMMERCIAL

## 2022-04-06 NOTE — TELEPHONE ENCOUNTER
charli voicemail with test results(normal)repeat test in a year and we will see her end of May for her appointment

## 2022-04-22 ENCOUNTER — ANCILLARY PROCEDURE (OUTPATIENT)
Dept: MAMMOGRAPHY | Facility: CLINIC | Age: 62
End: 2022-04-22
Attending: FAMILY MEDICINE
Payer: COMMERCIAL

## 2022-04-22 DIAGNOSIS — Z12.31 ENCOUNTER FOR SCREENING MAMMOGRAM FOR BREAST CANCER: ICD-10-CM

## 2022-04-22 PROCEDURE — 77067 SCR MAMMO BI INCL CAD: CPT

## 2022-04-23 NOTE — RESULT ENCOUNTER NOTE
Please call the patient with the following:    Alexander Sifuentes,    You mammogram was negative. Recommend repeat mammogram in 1 year.     Dr. Armstrong

## 2022-05-19 DIAGNOSIS — I10 ESSENTIAL HYPERTENSION: ICD-10-CM

## 2022-05-24 RX ORDER — OLMESARTAN MEDOXOMIL 20 MG/1
20 TABLET ORAL DAILY
Qty: 90 TABLET | Refills: 1 | Status: SHIPPED | OUTPATIENT
Start: 2022-05-24 | End: 2022-06-03

## 2022-06-03 ENCOUNTER — OFFICE VISIT (OUTPATIENT)
Dept: FAMILY MEDICINE | Facility: CLINIC | Age: 62
End: 2022-06-03
Payer: COMMERCIAL

## 2022-06-03 VITALS
DIASTOLIC BLOOD PRESSURE: 81 MMHG | TEMPERATURE: 98.6 F | HEIGHT: 63 IN | BODY MASS INDEX: 36.86 KG/M2 | SYSTOLIC BLOOD PRESSURE: 164 MMHG | RESPIRATION RATE: 18 BRPM | HEART RATE: 62 BPM | OXYGEN SATURATION: 100 % | WEIGHT: 208 LBS

## 2022-06-03 DIAGNOSIS — E78.00 PURE HYPERCHOLESTEROLEMIA: Primary | ICD-10-CM

## 2022-06-03 DIAGNOSIS — I10 ESSENTIAL HYPERTENSION: ICD-10-CM

## 2022-06-03 LAB
CHOLEST SERPL-MCNC: 274 MG/DL
FASTING STATUS PATIENT QL REPORTED: ABNORMAL
HDLC SERPL-MCNC: 42 MG/DL
LDLC SERPL CALC-MCNC: 205 MG/DL
TRIGL SERPL-MCNC: 134 MG/DL

## 2022-06-03 PROCEDURE — 36415 COLL VENOUS BLD VENIPUNCTURE: CPT | Performed by: STUDENT IN AN ORGANIZED HEALTH CARE EDUCATION/TRAINING PROGRAM

## 2022-06-03 PROCEDURE — 99214 OFFICE O/P EST MOD 30 MIN: CPT | Mod: GC | Performed by: STUDENT IN AN ORGANIZED HEALTH CARE EDUCATION/TRAINING PROGRAM

## 2022-06-03 PROCEDURE — 80061 LIPID PANEL: CPT | Performed by: STUDENT IN AN ORGANIZED HEALTH CARE EDUCATION/TRAINING PROGRAM

## 2022-06-03 RX ORDER — LOSARTAN POTASSIUM 50 MG/1
50 TABLET ORAL DAILY
Qty: 90 TABLET | Refills: 1 | Status: SHIPPED | OUTPATIENT
Start: 2022-06-03 | End: 2022-06-03

## 2022-06-03 RX ORDER — LOSARTAN POTASSIUM 25 MG/1
25 TABLET ORAL DAILY
Qty: 90 TABLET | Refills: 1 | Status: SHIPPED | OUTPATIENT
Start: 2022-06-03 | End: 2023-01-09

## 2022-06-03 NOTE — PROGRESS NOTES
Assessment & Plan     (E78.00) Pure hypercholesterolemia  (primary encounter diagnosis)  Comment: Recheck today. If persistently elevated, will initiate Ezetimibe as she is already on max-dose statin. Cholesterol still persistently elevated in setting of consistent high dose statin use. Will initiate secondary cholesterol lowering medication.   Plan: Lipid Profile  - ezetimibe (ZETIA) 10 MG tablet; Take 1 tablet (10 mg) by mouth daily  Dispense: 60 tablet; Refill: 1  - Repeat lipid labs in 3 months    (I10) Essential hypertension  Comment: Elevated today in clinic, though she notes that her pressures have been better at home. Agreed on RTC in 2 weeks for recheck, to consider dose increase at that time if persistently elevated. Switched to Cozaar for insurance coverage concerns.   Plan: losartan (COZAAR) 25 MG tablet       Emphysema findings on low-dose screening chest CT.  Comment: Discussed findings today. Advised on continued smoking cessation, which she was agreeable to.  Plan: Repeat low-dose screening chest CT in one year.     Bib Cagle, MS3    I was present with the medical student who participated in the service and in the documentation of the note. I have verified the history and personally performed the physical exam and medical decision making. I agree with the assessment and plan of care as documented in the note.    Michelle Armstrong MD   Family Medicine Resident PGY-3  [facility]    Precepted today with: Digna Parnell MD        Milagros Sifuentes is a 62 year old who presents for HTN and dyslipidemia follow-up.     HPI     She has been working on her diet and exercise since last visit - walks everyday and has been eating more salads. Though, she does note some increased family stress lately. Her BP readings at home have ranged around 118/76, 120/80.    She was called about some emphysema findings on a recent low-dose screening chest CT and would like some  "clarification on this.     Overall:  No headaches, no leg swelling, no chest pain, no shortness of breath  No tingling pains  No blurry vision  No changes since starting ARB    Bib Cagle, MS3  [employer]    Review of Systems   Constitutional, HEENT, cardiovascular, pulmonary, gi and gu systems are negative, except as otherwise noted.      Objective    BP (!) 164/81   Pulse 62   Temp 98.6  F (37  C) (Oral)   Resp 18   Ht 1.59 m (5' 2.6\")   Wt 94.3 kg (208 lb)   SpO2 100%   BMI 37.32 kg/m    Body mass index is 37.32 kg/m .  Physical Exam   GENERAL: healthy, alert and no distress  EYES: Eyes grossly normal to inspection, conjunctivae and sclerae normal  RESP: lungs clear to auscultation - no rales, rhonchi or wheezes  CV: regular rate and rhythm, normal S1 S2, no S3 or S4, no murmur, click or rub, no peripheral edema  MS: no gross musculoskeletal defects noted, no edema  SKIN: no suspicious lesions or rashes  NEURO: Normal strength and tone, mentation intact and speech normal  PSYCH: mentation appears normal, affect normal/bright    Results for orders placed or performed in visit on 06/03/22   Lipid Profile     Status: Abnormal   Result Value Ref Range    Cholesterol 274 (H) <=199 mg/dL    Triglycerides 134 <=149 mg/dL    Direct Measure HDL 42 (L) >=50 mg/dL    LDL Cholesterol Calculated 205 (H) <=129 mg/dL    Patient Fasting > 8hrs? Unknown        "

## 2022-06-06 RX ORDER — EZETIMIBE 10 MG/1
10 TABLET ORAL DAILY
Qty: 60 TABLET | Refills: 1 | Status: SHIPPED | OUTPATIENT
Start: 2022-06-06 | End: 2022-08-19

## 2022-06-06 NOTE — RESULT ENCOUNTER NOTE
[facility] with [facility] following:    Alexander Sifuentes,    Your cholesterol levels are essentially the same as last time. I think we should probably move forward with starting the second cholesterol medications, Ezetimibe. This medication can cause some fatigue and GI upset. We will have you follow up in 3 months for another cholesterol check.     Dr. Armstrong

## 2022-06-07 ENCOUNTER — TELEPHONE (OUTPATIENT)
Dept: FAMILY MEDICINE | Facility: CLINIC | Age: 62
End: 2022-06-07

## 2022-06-27 ENCOUNTER — OFFICE VISIT (OUTPATIENT)
Dept: FAMILY MEDICINE | Facility: CLINIC | Age: 62
End: 2022-06-27
Payer: COMMERCIAL

## 2022-06-27 VITALS
TEMPERATURE: 98 F | HEIGHT: 63 IN | HEART RATE: 68 BPM | RESPIRATION RATE: 20 BRPM | WEIGHT: 207 LBS | BODY MASS INDEX: 36.68 KG/M2 | SYSTOLIC BLOOD PRESSURE: 132 MMHG | OXYGEN SATURATION: 99 % | DIASTOLIC BLOOD PRESSURE: 78 MMHG

## 2022-06-27 DIAGNOSIS — E78.00 PURE HYPERCHOLESTEROLEMIA: ICD-10-CM

## 2022-06-27 DIAGNOSIS — Z71.85 VACCINE COUNSELING: ICD-10-CM

## 2022-06-27 DIAGNOSIS — I10 ESSENTIAL HYPERTENSION: Primary | ICD-10-CM

## 2022-06-27 DIAGNOSIS — Z23 HIGH PRIORITY FOR 2019-NCOV VACCINE: ICD-10-CM

## 2022-06-27 PROBLEM — R03.0 ELEVATED BLOOD PRESSURE READING WITHOUT DIAGNOSIS OF HYPERTENSION: Status: RESOLVED | Noted: 2018-02-10 | Resolved: 2022-06-27

## 2022-06-27 PROCEDURE — 0064A COVID-19,PF,MODERNA (18+ YRS BOOSTER .25ML): CPT | Performed by: STUDENT IN AN ORGANIZED HEALTH CARE EDUCATION/TRAINING PROGRAM

## 2022-06-27 PROCEDURE — 99214 OFFICE O/P EST MOD 30 MIN: CPT | Mod: 25 | Performed by: STUDENT IN AN ORGANIZED HEALTH CARE EDUCATION/TRAINING PROGRAM

## 2022-06-27 PROCEDURE — 91306 COVID-19,PF,MODERNA (18+ YRS BOOSTER .25ML): CPT | Performed by: STUDENT IN AN ORGANIZED HEALTH CARE EDUCATION/TRAINING PROGRAM

## 2022-06-27 ASSESSMENT — ANXIETY QUESTIONNAIRES
1. FEELING NERVOUS, ANXIOUS, OR ON EDGE: SEVERAL DAYS
7. FEELING AFRAID AS IF SOMETHING AWFUL MIGHT HAPPEN: NOT AT ALL
2. NOT BEING ABLE TO STOP OR CONTROL WORRYING: MORE THAN HALF THE DAYS
3. WORRYING TOO MUCH ABOUT DIFFERENT THINGS: MORE THAN HALF THE DAYS
IF YOU CHECKED OFF ANY PROBLEMS ON THIS QUESTIONNAIRE, HOW DIFFICULT HAVE THESE PROBLEMS MADE IT FOR YOU TO DO YOUR WORK, TAKE CARE OF THINGS AT HOME, OR GET ALONG WITH OTHER PEOPLE: NOT DIFFICULT AT ALL
6. BECOMING EASILY ANNOYED OR IRRITABLE: NOT AT ALL
GAD7 TOTAL SCORE: 7
5. BEING SO RESTLESS THAT IT IS HARD TO SIT STILL: SEVERAL DAYS
GAD7 TOTAL SCORE: 7

## 2022-06-27 ASSESSMENT — PATIENT HEALTH QUESTIONNAIRE - PHQ9
5. POOR APPETITE OR OVEREATING: SEVERAL DAYS
SUM OF ALL RESPONSES TO PHQ QUESTIONS 1-9: 7

## 2022-06-27 NOTE — PROGRESS NOTES
"  Assessment & Plan     Essential hypertension  Patient doing well with bp medication. Normotensive on recheck today.   -Continue Cozaar at current dose.     Pure hypercholesterolemia  Patient doing well on Ezetimibe, no side effects.  -Plan to redraw cholesterol labs in September, 3 months after last check  -Continue statin    Vaccine counseling  High priority for 2019-nCoV vaccine  Will have patient obtain shingles and Tdap vaccines at local pharmacy due to insurance.   - COVID-19,PF,MODERNA (18+ Yrs BOOSTER .25mL)    Options for treatment and follow-up care were reviewed with the patient and/or guardian. Pt and/or guardian engaged in the decision making process and verbalized understanding of the options discussed and agreed with the final plan.    Precepted today with: MD Michelle Lopez MD  [facility] Family Medicine Resident PGY-3  [facility]    Milagros Sifuentes is a 62 year old, presenting for the following health issues:  Hypertension (Follow up BP,)      HPI     Bp med is going well. Taking everyday. No chest pain, no sob, no FND, no HA or blurry vision. Ezetimibe is going well, doesn't report any side effects.     Still exercising and eating plenty of vegetables, especially beets. She states that when she doesn't walk daily, she can feel the difference so is making sure she is doing it daily.    Review of Systems   Constitutional, HEENT, cardiovascular, pulmonary, gi and gu systems are negative, except as otherwise noted.      Objective    /78   Pulse 68   Temp 98  F (36.7  C) (Oral)   Resp 20   Ht 1.594 m (5' 2.76\")   Wt 93.9 kg (207 lb)   SpO2 99%   BMI 36.95 kg/m    Body mass index is 36.95 kg/m .  Physical Exam   GENERAL: healthy, alert and no distress  EYES: Eyes grossly normal to inspection and conjunctivae and sclerae normal  RESP: lungs clear to auscultation - no rales, rhonchi or wheezes  CV: regular rate and rhythm, normal S1 S2, no S3 or " S4, no murmur, click or rub  MS: no gross musculoskeletal defects noted, no edema  SKIN: no suspicious lesions or rashes  NEURO: Normal strength and tone, mentation intact and speech normal  PSYCH: mentation appears normal, affect normal/bright      .  ..

## 2022-07-11 ENCOUNTER — OFFICE VISIT (OUTPATIENT)
Dept: FAMILY MEDICINE | Facility: CLINIC | Age: 62
End: 2022-07-11
Payer: COMMERCIAL

## 2022-07-11 ENCOUNTER — HOSPITAL ENCOUNTER (OUTPATIENT)
Dept: RADIOLOGY | Facility: HOSPITAL | Age: 62
Discharge: HOME | End: 2022-07-11
Attending: FAMILY MEDICINE | Admitting: FAMILY MEDICINE
Payer: COMMERCIAL

## 2022-07-11 VITALS
RESPIRATION RATE: 12 BRPM | BODY MASS INDEX: 36.68 KG/M2 | OXYGEN SATURATION: 95 % | DIASTOLIC BLOOD PRESSURE: 74 MMHG | TEMPERATURE: 97.9 F | WEIGHT: 207 LBS | SYSTOLIC BLOOD PRESSURE: 131 MMHG | HEART RATE: 67 BPM | HEIGHT: 63 IN

## 2022-07-11 DIAGNOSIS — M79.672 LEFT FOOT PAIN: ICD-10-CM

## 2022-07-11 DIAGNOSIS — M63.872: ICD-10-CM

## 2022-07-11 DIAGNOSIS — M79.672 LEFT FOOT PAIN: Primary | ICD-10-CM

## 2022-07-11 DIAGNOSIS — G58.9 MONONEUROPATHY: ICD-10-CM

## 2022-07-11 LAB
ALBUMIN SERPL BCG-MCNC: 4.2 G/DL (ref 3.5–5.2)
ALP SERPL-CCNC: 97 U/L (ref 35–104)
ALT SERPL W P-5'-P-CCNC: 16 U/L (ref 10–35)
AST SERPL W P-5'-P-CCNC: 19 U/L (ref 10–35)
BILIRUB DIRECT SERPL-MCNC: <0.2 MG/DL (ref 0–0.3)
BILIRUB SERPL-MCNC: 0.2 MG/DL
ERYTHROCYTE [SEDIMENTATION RATE] IN BLOOD BY WESTERGREN METHOD: 20 MM/HR (ref 0–30)
PROT SERPL-MCNC: 7.3 G/DL (ref 6.4–8.3)
TSH SERPL DL<=0.005 MIU/L-ACNC: 1.24 UIU/ML (ref 0.3–4.2)
VIT B12 SERPL-MCNC: 746 PG/ML (ref 232–1245)

## 2022-07-11 PROCEDURE — 99214 OFFICE O/P EST MOD 30 MIN: CPT | Mod: GC | Performed by: STUDENT IN AN ORGANIZED HEALTH CARE EDUCATION/TRAINING PROGRAM

## 2022-07-11 PROCEDURE — 86038 ANTINUCLEAR ANTIBODIES: CPT | Performed by: STUDENT IN AN ORGANIZED HEALTH CARE EDUCATION/TRAINING PROGRAM

## 2022-07-11 PROCEDURE — 85652 RBC SED RATE AUTOMATED: CPT | Performed by: STUDENT IN AN ORGANIZED HEALTH CARE EDUCATION/TRAINING PROGRAM

## 2022-07-11 PROCEDURE — 73630 X-RAY EXAM OF FOOT: CPT | Mod: LT

## 2022-07-11 PROCEDURE — 82607 VITAMIN B-12: CPT | Performed by: STUDENT IN AN ORGANIZED HEALTH CARE EDUCATION/TRAINING PROGRAM

## 2022-07-11 PROCEDURE — 80076 HEPATIC FUNCTION PANEL: CPT | Performed by: STUDENT IN AN ORGANIZED HEALTH CARE EDUCATION/TRAINING PROGRAM

## 2022-07-11 PROCEDURE — 36415 COLL VENOUS BLD VENIPUNCTURE: CPT | Performed by: STUDENT IN AN ORGANIZED HEALTH CARE EDUCATION/TRAINING PROGRAM

## 2022-07-11 PROCEDURE — 84443 ASSAY THYROID STIM HORMONE: CPT | Performed by: STUDENT IN AN ORGANIZED HEALTH CARE EDUCATION/TRAINING PROGRAM

## 2022-07-11 RX ORDER — PREDNISONE 20 MG/1
40 TABLET ORAL DAILY
Qty: 10 TABLET | Refills: 0 | Status: SHIPPED | OUTPATIENT
Start: 2022-07-11 | End: 2022-08-19

## 2022-07-11 NOTE — PROGRESS NOTES
Assessment & Plan     (M79.672) Left foot pain  (primary encounter diagnosis)  (M63.872) Disorders of muscle in diseases classified elsewhere, left ankle and foot   Comment: 1 week of gradual onset pain noted to dorsum of left foot. PE shows afebrile patient with no significant redness or warmth. Dorsum of left foot is quite tender to light touch. DDx includes gout vs osteoarthritis vs neuropathy vs DVT, vs cellulitis. No redness, fevers, or chills to suggest cellulitis. Distribution of the pain over the entire dorsum of the foot makes gout less likely. No calf tenderness or palpable cords to suggest DVT. Arthritic reaction vs neuropathy are most likely etiologies.   Plan: Vitamin B12, Hepatic function panel, Anti         Nuclear Parul IgG by IFA with Reflex, TSH with         free T4 reflex, Erythrocyte sedimentation rate         auto, XR Foot Left G/E 3 Views, predniSONE         (DELTASONE) 20 MG tablet        Follow up in 1 week                Bon Caicedo, MS3    I was present with the medical student who participated in the service and in the documentation of this note. I have verified the history and personally performed the physical exam and medical decision making, and have verified the content of the note, which accurately reflects my assessment of the patient and the plan of care.     Emre Francis DO  [facility]    Precepted with Dr. Melyssa Phillip,     Subjective   Bear is a 62 year old presenting for the following health issues:  Foot Pain    HPI     Bear Obregon presents for evaluation of foot pain. Pt endorses gradual onset pain noted to the dorsum of her left foot for about 1 week. Describes the pain as throbbing and rates it a 9/10. Pain does not radiate. She has tried ibuprofen and Tylenol with no significant relief. Soaking the foot in warm water and epsom salt baths has provided significant relief. Denies paresthesias or numbness of the foot/toes.  "      Review of Systems   CONSTITUTIONAL: NEGATIVE for fever, chills,   MUSCULOSKELETAL: Positive for left foot pain. Negative for calf pain  NEURO: NEGATIVE for weakness, paresthesias, numbness,       Objective    /74   Pulse 67   Temp 97.9  F (36.6  C)   Resp 12   Ht 1.61 m (5' 3.39\")   Wt 93.9 kg (207 lb)   SpO2 95%   BMI 36.22 kg/m    Body mass index is 36.22 kg/m .  Physical Exam   GENERAL: healthy, alert and no distress  EYES: Eyes grossly normal to inspection, conjunctivae and sclerae normal  NECK: full ROM, moves neck freely  RESP: breathing easily on RA, no respiratory distress  CV: 1+ BL lower extremity edema, slightly more pronounced in the left foot/ankle.   MS: ROM of left ankle and left MTP joints limited secondary to pain, dorsum of left foot is tender to light touch, no calf tenderness, no palpable cords.   SKIN: no significant redness of the left foot, no significant warmth of the left foot when compared to the right.   NEURO: Normal strength and tone, mentation intact and speech normal  PSYCH: mentation appears normal, affect normal/bright    ----- Services Performed by a MEDICAL STUDENT in Presence of RESIDENT/FELLOW Physician-------            .  ..  "

## 2022-07-11 NOTE — LETTER
July 13, 2022      Bear Obregon  [address] [place] [place] [address]        Dear ,    We are writing to inform you of your test results.    Dear Bear Obregon,     Thank you for visiting our clinic on Jul 11, 2022.     The result of your recent labwork has returned. We checked labs to look for causes of peripheral neuropathy (nerve pain). All of these labs (vitamin B12, liver function, thyroid levels, and markers of inflammation (ESR) thus far have been normal. This is good. Please continue taking the prednisone for the 5 day course, and we would like to follow up soon (1-2 weeks) to see if that is helping.     If you have any questions or concerns, please feel free to give us a call.     Resulted Orders   Vitamin B12   Result Value Ref Range    Vitamin B12 746 232 - 1,245 pg/mL   Hepatic function panel   Result Value Ref Range    Protein Total 7.3 6.4 - 8.3 g/dL    Albumin 4.2 3.5 - 5.2 g/dL    Bilirubin Total 0.2 <=1.2 mg/dL    Alkaline Phosphatase 97 35 - 104 U/L    AST 19 10 - 35 U/L    ALT 16 10 - 35 U/L    Bilirubin Direct <0.20 0.00 - 0.30 mg/dL   TSH with free T4 reflex   Result Value Ref Range    TSH 1.24 0.30 - 4.20 uIU/mL   Erythrocyte sedimentation rate auto   Result Value Ref Range    Erythrocyte Sedimentation Rate 20 0 - 30 mm/hr       If you have any questions or concerns, please call the clinic at the number listed above.       Sincerely,      Melyssa Phillip, DO

## 2022-07-12 LAB — ANA SER QL IF: NEGATIVE

## 2022-07-12 NOTE — RESULT ENCOUNTER NOTE
Please call with results; if no response send letter;    Dear Bear Obregon,    Thank you for visiting our clinic on Jul 11, 2022.    The result of your recent labwork has returned. We checked labs to look for causes of peripheral neuropathy (nerve pain). All of these labs (vitamin B12, liver function, thyroid levels, and markers of inflammation (ESR) thus far have been normal. This is good. Please continue taking the prednisone for the 5 day course, and we would like to follow up soon (1-2 weeks) to see if that is helping.    If you have any questions or concerns, please feel free to give us a call.      Sincerely,  Dr. Emre Francis, DO [facility]

## 2022-07-18 NOTE — PROGRESS NOTES
Preceptor Attestation:   Patient seen, evaluated and discussed with the resident. I have verified the content of the note, which accurately reflects my assessment of the patient and the plan of care.  Supervising Physician:Melyssa Phillip DO [facility]

## 2022-08-19 ENCOUNTER — OFFICE VISIT (OUTPATIENT)
Dept: FAMILY MEDICINE | Facility: CLINIC | Age: 62
End: 2022-08-19
Payer: COMMERCIAL

## 2022-08-19 VITALS
BODY MASS INDEX: 37.36 KG/M2 | SYSTOLIC BLOOD PRESSURE: 135 MMHG | WEIGHT: 203 LBS | HEART RATE: 61 BPM | HEIGHT: 62 IN | OXYGEN SATURATION: 99 % | RESPIRATION RATE: 22 BRPM | TEMPERATURE: 98 F | DIASTOLIC BLOOD PRESSURE: 76 MMHG

## 2022-08-19 DIAGNOSIS — M25.561 ARTHRALGIA OF RIGHT KNEE: ICD-10-CM

## 2022-08-19 DIAGNOSIS — E78.00 PURE HYPERCHOLESTEROLEMIA: Primary | ICD-10-CM

## 2022-08-19 LAB
CHOLEST SERPL-MCNC: 205 MG/DL
HDLC SERPL-MCNC: 73 MG/DL
LDLC SERPL CALC-MCNC: 125 MG/DL
NONHDLC SERPL-MCNC: 132 MG/DL
TRIGL SERPL-MCNC: 37 MG/DL

## 2022-08-19 PROCEDURE — 80061 LIPID PANEL: CPT

## 2022-08-19 PROCEDURE — 99214 OFFICE O/P EST MOD 30 MIN: CPT | Mod: GC

## 2022-08-19 PROCEDURE — 36415 COLL VENOUS BLD VENIPUNCTURE: CPT

## 2022-08-19 RX ORDER — EZETIMIBE 10 MG/1
10 TABLET ORAL DAILY
Qty: 60 TABLET | Refills: 1 | Status: SHIPPED | OUTPATIENT
Start: 2022-08-19

## 2022-08-19 RX ORDER — ATORVASTATIN CALCIUM 80 MG/1
80 TABLET, FILM COATED ORAL DAILY
Qty: 90 TABLET | Refills: 3 | Status: SHIPPED | OUTPATIENT
Start: 2022-08-19 | End: 2023-08-21

## 2022-08-19 RX ORDER — IBUPROFEN 600 MG/1
600 TABLET, FILM COATED ORAL EVERY 6 HOURS PRN
Qty: 30 TABLET | Refills: 1 | Status: SHIPPED | OUTPATIENT
Start: 2022-08-19

## 2022-08-19 NOTE — PROGRESS NOTES
Preceptor Attestation:  Patient's case reviewed and discussed with Holly Lorenzana MD resident and I evaluated the patient. I agree with written assessment and plan of care.  Supervising Physician:  RAVI PALACIOS MD  [facility]

## 2022-08-19 NOTE — PROGRESS NOTES
Assessment and Plan     Diagnoses and all orders for this visit:    Pure hypercholesterolemia  Patient here for lipid recheck after initiating Ezetimibe at the beginning of June. Unfortunately has not been taking Lipitor in addition to Ezetimibe. Will return in 3 months for lab recheck at that time. Discussed need for taking both medications together. Patient understood and will now start taking both medications together.   -     Lipid Profile; Future    History of left knee arthroscopy  History of left knee surgery. Intermittently takes ibuprofen and needs refill today. Counseled on taking with food. Only takes very minimal. Kidney function normal.   -refilled ibuprofen     Options for treatment and follow-up care were reviewed with the patient and/or guardian. Bear Obregon and/or guardian engaged in the decision making process and verbalized understanding of the options discussed and agreed with the final plan.    Holly Lorenzana MD      Precepted today with: Amanda Hood MD         HPI:       Bear Obregon is a 62 year old  female with a significant past medical history of high cholesterol for presents for the following below: cholesterol recheck    Patient is here for cholesterol recheck. She was recently started on Ezetimbe 10mg in addition to her 80mg of Lipitor for ongoing elevated cholesterol outside of goal. Total cholesterol of 274 and LDL of 205 prior to initiation of Ezetimibe. Unfortunately, due to miscommunication patient has only been taking Ezetimibe and not Lipitor together.          PMHX:     Patient Active Problem List   Diagnosis     Hyperlipidemia     History of tobacco abuse     Class 2 obesity due to excess calories without serious comorbidity with body mass index (BMI) of 35.0 to 35.9 in adult     History of colonic polyps     Anxiety state     Disorder of bursae and tendons in shoulder region     Neck pain     Essential hypertension     Morbid obesity (H)       Current Outpatient  "Medications   Medication Sig Dispense Refill     atorvastatin (LIPITOR) 80 MG tablet Take 1 tablet (80 mg) by mouth daily 90 tablet 3     ezetimibe (ZETIA) 10 MG tablet Take 1 tablet (10 mg) by mouth daily 60 tablet 1     ibuprofen (ADVIL/MOTRIN) 600 MG tablet Take 1 tablet (600 mg) by mouth every 6 hours as needed for moderate pain 30 tablet 1     losartan (COZAAR) 25 MG tablet Take 1 tablet (25 mg) by mouth daily 90 tablet 1     omeprazole (PRILOSEC) 20 MG CR capsule Take 1 capsule (20 mg) by mouth daily 90 capsule 1     sertraline (ZOLOFT) 50 MG tablet Take 1 tablet (50 mg) by mouth daily 90 tablet 4       Social History     Tobacco Use     Smoking status: Former Smoker     Packs/day: 0.50     Years: 15.00     Pack years: 7.50     Types: Cigarettes     Quit date: 9/3/2006     Years since quitting: 15.9     Smokeless tobacco: Never Used   Vaping Use     Vaping Use: Never used   Substance Use Topics     Alcohol use: No     Drug use: No          Allergies   Allergen Reactions     Hydrocodone-Acetaminophen Itching     Oxycodone Itching     Sulfa Drugs Hives, Itching and Swelling     Hives and itching       No results found for this or any previous visit (from the past 24 hour(s)).         Review of Systems:     10 point ROS negative except for what is noted in HPI          Physical Exam:     Vitals:    08/19/22 0808   BP: 135/76   Pulse: 61   Resp: 22   Temp: 98  F (36.7  C)   SpO2: 99%   Weight: 92.1 kg (203 lb)   Height: 1.575 m (5' 2\")     Body mass index is 37.13 kg/m .      GENERAL APPEARANCE: healthy, alert and no distress,  EYES: Eyes grossly normal to inspection,  PERRL   NECK: no asymmetry  RESP: lungs clear to auscultation - no rales, rhonchi or wheezes  CV: regular rate and rhythm,  and no murmur, click,  rub or gallop  ABDOMEN: soft, nontender, non distended, bowel sounds present throughout  MS: extremities normal- no gross deformities noted  NEURO: Alert, mentation appears intact and speech " normal  PSYCH: mood and affect normal/bright

## 2022-08-26 ENCOUNTER — TELEPHONE (OUTPATIENT)
Dept: FAMILY MEDICINE | Facility: CLINIC | Age: 62
End: 2022-08-26

## 2022-08-26 NOTE — TELEPHONE ENCOUNTER
Ti voicemail to  for messages from dr Lorenzana, also told her sending letter    Alexander Obregon,     Thank you for visiting our clinic on Aug 19, 2022!     The result of your recent labwork has returned. Your cholesterol is elevated but improving. We will recheck again in 3 months after being on both your Lipitor and ezetimibe.       If you have any questions or concerns, please feel free to give us a call!       Thanks,   Holly Lorenzana MD   [facility]

## 2022-08-31 NOTE — TELEPHONE ENCOUNTER
Patient called back was confused and states she did not do any labs at last visit informed her it shows she did have labs done and there is a message from  in regards to results, relayed message to patient, patient verbalized understanding, no questions or concerns.

## 2022-10-16 ENCOUNTER — HEALTH MAINTENANCE LETTER (OUTPATIENT)
Age: 62
End: 2022-10-16

## 2023-01-09 DIAGNOSIS — I10 ESSENTIAL HYPERTENSION: ICD-10-CM

## 2023-01-13 RX ORDER — LOSARTAN POTASSIUM 25 MG/1
25 TABLET ORAL DAILY
Qty: 90 TABLET | Refills: 3 | Status: SHIPPED | OUTPATIENT
Start: 2023-01-13 | End: 2023-08-09 | Stop reason: DRUGHIGH

## 2023-04-18 ENCOUNTER — OFFICE VISIT (OUTPATIENT)
Dept: FAMILY MEDICINE | Facility: CLINIC | Age: 63
End: 2023-04-18
Payer: COMMERCIAL

## 2023-04-18 VITALS
BODY MASS INDEX: 34.38 KG/M2 | WEIGHT: 194 LBS | HEIGHT: 63 IN | TEMPERATURE: 97.9 F | OXYGEN SATURATION: 99 % | SYSTOLIC BLOOD PRESSURE: 151 MMHG | DIASTOLIC BLOOD PRESSURE: 76 MMHG | RESPIRATION RATE: 22 BRPM | HEART RATE: 62 BPM

## 2023-04-18 DIAGNOSIS — K11.20 SIALOADENITIS OF SUBMANDIBULAR GLAND: Primary | ICD-10-CM

## 2023-04-18 PROCEDURE — 99213 OFFICE O/P EST LOW 20 MIN: CPT | Mod: GC

## 2023-04-18 RX ORDER — IBUPROFEN 600 MG/1
600 TABLET, FILM COATED ORAL EVERY 8 HOURS PRN
Qty: 100 TABLET | Refills: 0 | Status: SHIPPED | OUTPATIENT
Start: 2023-04-18 | End: 2023-08-09

## 2023-04-18 NOTE — PROGRESS NOTES
Assessment & Plan     (K11.20) Sialoadenitis of submandibular gland  (primary encounter diagnosis)  Painful unilateral swelling in area of the right submandibular gland without other signs or symptoms of facial/oral or systemic infection is most concerning for sialadenitis. Also considered lymphadenitis and malignancy with the later being less likely due to her significant pain on exam.  Plan:   - Recommend salivary stimulants such as sour candies  - amoxicillin-clavulanate (AUGMENTIN) 875-125 MG tablet  - ibuprofen (ADVIL/MOTRIN) 600 MG tablet    Recommended she call clinic if the pain does not start to improve in the next couple of days. Consider US or CT imaging at this time    Shadi Stevens, MS3    I was present with the medical student who participated in the service and in the documentation of this note. I have verified the history and personally performed the physical exam and medical decision making, and have verified the content of the note, which accurately reflects my assessment of the patient and the plan of care.     Sandra Becerra MD  [facility]    Milagros Sifuentes is a 62 year old, presenting for the following health issues:  Ear Problem (Pain in ear) and Medication Reconciliation (Completed)         View : No data to display.              HPI   Right sided facial pain  Reports throbbing pain on the right side of her lower jaw since Saturday (4/15). The pain intermittently shoots up to her right ear. She reports ibuprofen has helped with the pain. Denies recent oral infection/dental work, fevers, chills, headache, fatigue, weight loss, SOB, chest pain. No changes to her hearing, vision, taste, or smell.     Other notable history  Reports she has had a runny nose for a couple of weeks since getting a new dog. Has improved slightly with sudafed. She is not sure if she has any known environmental allergies. 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    Review of Systems   Constitutional, HEENT, cardiovascular, pulmonary, gi and gu systems are negative, except as otherwise noted.      Objective    BP (!) 151/76   Pulse 62   Temp 97.9  F (36.6  C)   Resp 22   Ht 1.59 m (5' 2.6\")   Wt 88 kg (194 lb)   SpO2 99%   BMI 34.81 kg/m    Body mass index is 34.81 kg/m .  Physical Exam     Gen: Pleasant. No distress.    Eyes :PERRL, EOM intact. No scleral icterus.  HENT: Bilateral normal external ear and canals. Bilateral normal TM's. Bilateral nasal turbinate erythema. Evidence of previous well healed dental extractions, but no oral lesions. No pain or lesions appreciated on complete palpation of her oral cavity. No palpable stone in steven's duct  Neck: No overt asymmetry. Extremely tender lymphadenopathy in the right submandibular region.  CV: Appears well-perfused. RRR via radial pulse.   Resp: Breathing comfortably on room air.   Abd: Non-distended  Ext: No edema or overt asymmetry/deformity.   Skin: No overt rash on easily visualized skin.   Neuro: Non-focal. CN 2-12 grossly intact        "

## 2023-04-20 ENCOUNTER — APPOINTMENT (OUTPATIENT)
Dept: CT IMAGING | Facility: HOSPITAL | Age: 63
End: 2023-04-20
Attending: EMERGENCY MEDICINE
Payer: COMMERCIAL

## 2023-04-20 ENCOUNTER — HOSPITAL ENCOUNTER (EMERGENCY)
Facility: HOSPITAL | Age: 63
Discharge: HOME | End: 2023-04-20
Attending: EMERGENCY MEDICINE | Admitting: EMERGENCY MEDICINE
Payer: COMMERCIAL

## 2023-04-20 VITALS
HEIGHT: 62 IN | WEIGHT: 194 LBS | DIASTOLIC BLOOD PRESSURE: 69 MMHG | RESPIRATION RATE: 18 BRPM | SYSTOLIC BLOOD PRESSURE: 189 MMHG | HEART RATE: 56 BPM | BODY MASS INDEX: 35.7 KG/M2 | OXYGEN SATURATION: 100 % | TEMPERATURE: 97.8 F

## 2023-04-20 DIAGNOSIS — N20.0 KIDNEY STONE: ICD-10-CM

## 2023-04-20 DIAGNOSIS — R10.9 RIGHT FLANK PAIN: ICD-10-CM

## 2023-04-20 LAB
ALBUMIN UR-MCNC: 20 MG/DL
ANION GAP SERPL CALCULATED.3IONS-SCNC: 7 MMOL/L (ref 7–15)
APPEARANCE UR: CLEAR
BASOPHILS # BLD AUTO: 0 10E3/UL (ref 0–0.2)
BASOPHILS NFR BLD AUTO: 1 %
BILIRUB UR QL STRIP: NEGATIVE
BUN SERPL-MCNC: 13.2 MG/DL (ref 8–23)
CALCIUM SERPL-MCNC: 9 MG/DL (ref 8.8–10.2)
CHLORIDE SERPL-SCNC: 104 MMOL/L (ref 98–107)
COLOR UR AUTO: YELLOW
CREAT SERPL-MCNC: 0.64 MG/DL (ref 0.51–0.95)
DEPRECATED HCO3 PLAS-SCNC: 31 MMOL/L (ref 22–29)
EOSINOPHIL # BLD AUTO: 0.1 10E3/UL (ref 0–0.7)
EOSINOPHIL NFR BLD AUTO: 1 %
ERYTHROCYTE [DISTWIDTH] IN BLOOD BY AUTOMATED COUNT: 13.2 % (ref 10–15)
GFR SERPL CREATININE-BSD FRML MDRD: >90 ML/MIN/1.73M2
GLUCOSE SERPL-MCNC: 98 MG/DL (ref 70–99)
GLUCOSE UR STRIP-MCNC: NEGATIVE MG/DL
HCT VFR BLD AUTO: 45.5 % (ref 35–47)
HGB BLD-MCNC: 14.8 G/DL (ref 11.7–15.7)
HGB UR QL STRIP: NEGATIVE
IMM GRANULOCYTES # BLD: 0 10E3/UL
IMM GRANULOCYTES NFR BLD: 0 %
KETONES UR STRIP-MCNC: ABNORMAL MG/DL
LEUKOCYTE ESTERASE UR QL STRIP: NEGATIVE
LYMPHOCYTES # BLD AUTO: 2.5 10E3/UL (ref 0.8–5.3)
LYMPHOCYTES NFR BLD AUTO: 28 %
MCH RBC QN AUTO: 29.7 PG (ref 26.5–33)
MCHC RBC AUTO-ENTMCNC: 32.5 G/DL (ref 31.5–36.5)
MCV RBC AUTO: 91 FL (ref 78–100)
MONOCYTES # BLD AUTO: 0.7 10E3/UL (ref 0–1.3)
MONOCYTES NFR BLD AUTO: 8 %
MUCOUS THREADS #/AREA URNS LPF: PRESENT /LPF
NEUTROPHILS # BLD AUTO: 5.3 10E3/UL (ref 1.6–8.3)
NEUTROPHILS NFR BLD AUTO: 62 %
NITRATE UR QL: NEGATIVE
NRBC # BLD AUTO: 0 10E3/UL
NRBC BLD AUTO-RTO: 0 /100
PH UR STRIP: 6 [PH] (ref 5–7)
PLATELET # BLD AUTO: 269 10E3/UL (ref 150–450)
POTASSIUM SERPL-SCNC: 4.7 MMOL/L (ref 3.4–5.3)
RBC # BLD AUTO: 4.99 10E6/UL (ref 3.8–5.2)
RBC URINE: 0 /HPF
SODIUM SERPL-SCNC: 142 MMOL/L (ref 136–145)
SP GR UR STRIP: 1.03 (ref 1–1.03)
SQUAMOUS EPITHELIAL: 2 /HPF
TRANSITIONAL EPI: <1 /HPF
UROBILINOGEN UR STRIP-MCNC: 2 MG/DL
WBC # BLD AUTO: 8.6 10E3/UL (ref 4–11)
WBC URINE: 4 /HPF

## 2023-04-20 PROCEDURE — 250N000013 HC RX MED GY IP 250 OP 250 PS 637: Performed by: EMERGENCY MEDICINE

## 2023-04-20 PROCEDURE — 81001 URINALYSIS AUTO W/SCOPE: CPT | Performed by: EMERGENCY MEDICINE

## 2023-04-20 PROCEDURE — 36415 COLL VENOUS BLD VENIPUNCTURE: CPT | Performed by: EMERGENCY MEDICINE

## 2023-04-20 PROCEDURE — 99284 EMERGENCY DEPT VISIT MOD MDM: CPT | Mod: 25

## 2023-04-20 PROCEDURE — 80048 BASIC METABOLIC PNL TOTAL CA: CPT | Performed by: EMERGENCY MEDICINE

## 2023-04-20 PROCEDURE — 85025 COMPLETE CBC W/AUTO DIFF WBC: CPT | Performed by: EMERGENCY MEDICINE

## 2023-04-20 PROCEDURE — 74176 CT ABD & PELVIS W/O CONTRAST: CPT

## 2023-04-20 RX ORDER — CYCLOBENZAPRINE HCL 10 MG
10 TABLET ORAL ONCE
Status: COMPLETED | OUTPATIENT
Start: 2023-04-20 | End: 2023-04-20

## 2023-04-20 RX ORDER — IBUPROFEN 800 MG/1
800 TABLET, FILM COATED ORAL EVERY 8 HOURS PRN
Qty: 21 TABLET | Refills: 0 | Status: SHIPPED | OUTPATIENT
Start: 2023-04-20

## 2023-04-20 RX ORDER — ACETAMINOPHEN 325 MG/1
975 TABLET ORAL ONCE
Status: COMPLETED | OUTPATIENT
Start: 2023-04-20 | End: 2023-04-20

## 2023-04-20 RX ORDER — MORPHINE SULFATE 4 MG/ML
4 INJECTION, SOLUTION INTRAMUSCULAR; INTRAVENOUS ONCE
Status: DISCONTINUED | OUTPATIENT
Start: 2023-04-20 | End: 2023-04-20

## 2023-04-20 RX ORDER — CYCLOBENZAPRINE HCL 10 MG
10 TABLET ORAL 3 TIMES DAILY PRN
Qty: 21 TABLET | Refills: 0 | Status: SHIPPED | OUTPATIENT
Start: 2023-04-20

## 2023-04-20 RX ADMIN — ACETAMINOPHEN 975 MG: 325 TABLET ORAL at 08:43

## 2023-04-20 RX ADMIN — HYDROMORPHONE HYDROCHLORIDE 1 MG: 2 TABLET ORAL at 10:04

## 2023-04-20 RX ADMIN — Medication 50 MG: at 08:44

## 2023-04-20 RX ADMIN — CYCLOBENZAPRINE 10 MG: 10 TABLET, FILM COATED ORAL at 10:04

## 2023-04-20 NOTE — DISCHARGE INSTRUCTIONS
Your CT scan shows a stone in the right kidney but nothing in transit.  Laboratory work-up unremarkable.  There is no evidence of infection or blood in the urine that would suggest a recently passed stone.  This is reproducible with palpation and movement.  I suspect that the pain is related to musculoskeletal pain.  Use Flexeril, ibuprofen as needed.

## 2023-04-20 NOTE — ED TRIAGE NOTES
Patient has right flank pain for the last couple of days. Patient has tried baths, ibuprofen with little result. Patient does have history of kidney stones. Provider saw patient in triage.     Triage Assessment     Row Name 04/20/23 0738       Triage Assessment (Adult)    Airway WDL WDL       Respiratory WDL    Respiratory WDL WDL       Skin Circulation/Temperature WDL    Skin Circulation/Temperature WDL WDL       Cardiac WDL    Cardiac WDL WDL       Peripheral/Neurovascular WDL    Peripheral Neurovascular WDL WDL       Cognitive/Neuro/Behavioral WDL    Cognitive/Neuro/Behavioral WDL WDL
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
## 2023-04-21 ENCOUNTER — PATIENT OUTREACH (OUTPATIENT)
Dept: CARE COORDINATION | Facility: CLINIC | Age: 63
End: 2023-04-21
Payer: COMMERCIAL

## 2023-04-21 NOTE — PROGRESS NOTES
Clinic Care Coordination Contact    Follow Up Progress Note      Assessment: The pt was recently in the ED, I called to check up on the pt and help the pt setup a ED follow up. The pt was at [facility] for flank pain. I called the pt,but got her vm, so I left a vm for the pt to give me a call back.     Care Gaps:    Health Maintenance Due   Topic Date Due     SPIROMETRY  Never done     COPD ACTION PLAN  Never done     ZOSTER IMMUNIZATION (1 of 2) Never done     Pneumococcal Vaccine: Pediatrics (0 to 5 Years) and At-Risk Patients (6 to 64 Years) (2 - PCV) 07/27/2012     DTAP/TDAP/TD IMMUNIZATION (3 - Td or Tdap) 12/08/2019     COVID-19 Vaccine (5 - Booster for Moderna series) 08/22/2022     MEDICARE ANNUAL WELLNESS VISIT  03/16/2023           Care Plans      Intervention/Education provided during outreach:               Plan:     Care Coordinator will follow up in

## 2023-04-25 ENCOUNTER — PATIENT OUTREACH (OUTPATIENT)
Dept: CARE COORDINATION | Facility: CLINIC | Age: 63
End: 2023-04-25
Payer: COMMERCIAL

## 2023-04-25 NOTE — PROGRESS NOTES
Clinic Care Coordination Contact    Follow Up Progress Note      Assessment: The pt was recently in the ED, I called to check up on the pt and help the pt setup a ED follow up. The pt was at [facility] for flank pain. I called and talked to the pt, pt stated that she is doing better. She stated that she will call to schedule if she needs a follow up.    Care Gaps:    Health Maintenance Due   Topic Date Due     SPIROMETRY  Never done     COPD ACTION PLAN  Never done     ZOSTER IMMUNIZATION (1 of 2) Never done     Pneumococcal Vaccine: Pediatrics (0 to 5 Years) and At-Risk Patients (6 to 64 Years) (2 - PCV) 07/27/2012     DTAP/TDAP/TD IMMUNIZATION (3 - Td or Tdap) 12/08/2019     COVID-19 Vaccine (5 - Booster for Moderna series) 08/22/2022     MEDICARE ANNUAL WELLNESS VISIT  03/16/2023           Care Plans      Intervention/Education provided during outreach:               Plan:     Care Coordinator will follow up in

## 2023-05-22 DIAGNOSIS — F43.20 ADJUSTMENT DISORDER, UNSPECIFIED TYPE: ICD-10-CM

## 2023-05-22 NOTE — TELEPHONE ENCOUNTER
Refilled Zoloft (covering for Dr. Lorenzana). Looked back at prior visits - has not had follow-up visit documented for mood in >1-2 years. Also was due for cholesterol recheck in the fall. Forwarding to red team to facilitate scheduling of follow-up visit for mood and cholesterol recheck.

## 2023-06-01 ENCOUNTER — HEALTH MAINTENANCE LETTER (OUTPATIENT)
Age: 63
End: 2023-06-01

## 2023-07-18 ENCOUNTER — OFFICE VISIT (OUTPATIENT)
Dept: FAMILY MEDICINE | Facility: CLINIC | Age: 63
End: 2023-07-18
Payer: COMMERCIAL

## 2023-07-18 VITALS
HEART RATE: 58 BPM | RESPIRATION RATE: 19 BRPM | BODY MASS INDEX: 35.15 KG/M2 | DIASTOLIC BLOOD PRESSURE: 64 MMHG | SYSTOLIC BLOOD PRESSURE: 156 MMHG | HEIGHT: 62 IN | OXYGEN SATURATION: 98 % | TEMPERATURE: 98.6 F | WEIGHT: 191 LBS

## 2023-07-18 DIAGNOSIS — Z23 NEED FOR DIPHTHERIA-TETANUS-PERTUSSIS (TDAP) VACCINE: Primary | ICD-10-CM

## 2023-07-18 DIAGNOSIS — S61.412A: ICD-10-CM

## 2023-07-18 PROCEDURE — 99213 OFFICE O/P EST LOW 20 MIN: CPT | Mod: 25

## 2023-07-18 PROCEDURE — 90471 IMMUNIZATION ADMIN: CPT

## 2023-07-18 PROCEDURE — 90715 TDAP VACCINE 7 YRS/> IM: CPT
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
## 2023-07-20 ENCOUNTER — HOSPITAL ENCOUNTER (EMERGENCY)
Facility: HOSPITAL | Age: 63
Discharge: HOME | End: 2023-07-21
Attending: STUDENT IN AN ORGANIZED HEALTH CARE EDUCATION/TRAINING PROGRAM | Admitting: STUDENT IN AN ORGANIZED HEALTH CARE EDUCATION/TRAINING PROGRAM
Payer: COMMERCIAL

## 2023-07-20 ENCOUNTER — APPOINTMENT (OUTPATIENT)
Dept: MRI IMAGING | Facility: HOSPITAL | Age: 63
End: 2023-07-20
Attending: STUDENT IN AN ORGANIZED HEALTH CARE EDUCATION/TRAINING PROGRAM
Payer: COMMERCIAL

## 2023-07-20 ENCOUNTER — APPOINTMENT (OUTPATIENT)
Dept: CT IMAGING | Facility: HOSPITAL | Age: 63
End: 2023-07-20
Attending: STUDENT IN AN ORGANIZED HEALTH CARE EDUCATION/TRAINING PROGRAM
Payer: COMMERCIAL

## 2023-07-20 DIAGNOSIS — R42 VERTIGO: ICD-10-CM

## 2023-07-20 LAB
ALBUMIN SERPL BCG-MCNC: 4.3 G/DL (ref 3.5–5.2)
ALP SERPL-CCNC: 96 U/L (ref 35–104)
ALT SERPL W P-5'-P-CCNC: 16 U/L (ref 0–50)
ANION GAP SERPL CALCULATED.3IONS-SCNC: 11 MMOL/L (ref 7–15)
AST SERPL W P-5'-P-CCNC: 26 U/L (ref 0–45)
BASOPHILS # BLD AUTO: 0 10E3/UL (ref 0–0.2)
BASOPHILS NFR BLD AUTO: 0 %
BILIRUB SERPL-MCNC: 0.2 MG/DL
BUN SERPL-MCNC: 10.5 MG/DL (ref 8–23)
CALCIUM SERPL-MCNC: 9.6 MG/DL (ref 8.8–10.2)
CHLORIDE SERPL-SCNC: 105 MMOL/L (ref 98–107)
CREAT SERPL-MCNC: 0.62 MG/DL (ref 0.51–0.95)
DEPRECATED HCO3 PLAS-SCNC: 26 MMOL/L (ref 22–29)
EOSINOPHIL # BLD AUTO: 0.1 10E3/UL (ref 0–0.7)
EOSINOPHIL NFR BLD AUTO: 1 %
ERYTHROCYTE [DISTWIDTH] IN BLOOD BY AUTOMATED COUNT: 13 % (ref 10–15)
GFR SERPL CREATININE-BSD FRML MDRD: >90 ML/MIN/1.73M2
GLUCOSE SERPL-MCNC: 147 MG/DL (ref 70–99)
HCT VFR BLD AUTO: 44.4 % (ref 35–47)
HGB BLD-MCNC: 13.6 G/DL (ref 11.7–15.7)
HOLD SPECIMEN: NORMAL
HOLD SPECIMEN: NORMAL
IMM GRANULOCYTES # BLD: 0.1 10E3/UL
IMM GRANULOCYTES NFR BLD: 1 %
LYMPHOCYTES # BLD AUTO: 2.5 10E3/UL (ref 0.8–5.3)
LYMPHOCYTES NFR BLD AUTO: 21 %
MCH RBC QN AUTO: 28.4 PG (ref 26.5–33)
MCHC RBC AUTO-ENTMCNC: 30.6 G/DL (ref 31.5–36.5)
MCV RBC AUTO: 93 FL (ref 78–100)
MONOCYTES # BLD AUTO: 0.7 10E3/UL (ref 0–1.3)
MONOCYTES NFR BLD AUTO: 6 %
NEUTROPHILS # BLD AUTO: 8.8 10E3/UL (ref 1.6–8.3)
NEUTROPHILS NFR BLD AUTO: 71 %
NRBC # BLD AUTO: 0 10E3/UL
NRBC BLD AUTO-RTO: 0 /100
PLATELET # BLD AUTO: 250 10E3/UL (ref 150–450)
POTASSIUM SERPL-SCNC: 3.6 MMOL/L (ref 3.4–5.3)
PROT SERPL-MCNC: 7.6 G/DL (ref 6.4–8.3)
RBC # BLD AUTO: 4.79 10E6/UL (ref 3.8–5.2)
SODIUM SERPL-SCNC: 142 MMOL/L (ref 136–145)
TROPONIN T SERPL HS-MCNC: <6 NG/L
WBC # BLD AUTO: 12.2 10E3/UL (ref 4–11)

## 2023-07-20 PROCEDURE — 70553 MRI BRAIN STEM W/O & W/DYE: CPT

## 2023-07-20 PROCEDURE — 70549 MR ANGIOGRAPH NECK W/O&W/DYE: CPT

## 2023-07-20 PROCEDURE — 99285 EMERGENCY DEPT VISIT HI MDM: CPT | Mod: 25

## 2023-07-20 PROCEDURE — A9585 GADOBUTROL INJECTION: HCPCS | Mod: JZ | Performed by: STUDENT IN AN ORGANIZED HEALTH CARE EDUCATION/TRAINING PROGRAM

## 2023-07-20 PROCEDURE — 250N000013 HC RX MED GY IP 250 OP 250 PS 637: Performed by: STUDENT IN AN ORGANIZED HEALTH CARE EDUCATION/TRAINING PROGRAM

## 2023-07-20 PROCEDURE — 80053 COMPREHEN METABOLIC PANEL: CPT | Performed by: STUDENT IN AN ORGANIZED HEALTH CARE EDUCATION/TRAINING PROGRAM

## 2023-07-20 PROCEDURE — 258N000003 HC RX IP 258 OP 636: Performed by: STUDENT IN AN ORGANIZED HEALTH CARE EDUCATION/TRAINING PROGRAM

## 2023-07-20 PROCEDURE — 85004 AUTOMATED DIFF WBC COUNT: CPT | Performed by: STUDENT IN AN ORGANIZED HEALTH CARE EDUCATION/TRAINING PROGRAM

## 2023-07-20 PROCEDURE — 93005 ELECTROCARDIOGRAM TRACING: CPT | Performed by: STUDENT IN AN ORGANIZED HEALTH CARE EDUCATION/TRAINING PROGRAM

## 2023-07-20 PROCEDURE — 36415 COLL VENOUS BLD VENIPUNCTURE: CPT | Performed by: STUDENT IN AN ORGANIZED HEALTH CARE EDUCATION/TRAINING PROGRAM

## 2023-07-20 PROCEDURE — 96374 THER/PROPH/DIAG INJ IV PUSH: CPT | Mod: 59

## 2023-07-20 PROCEDURE — 70548 MR ANGIOGRAPHY NECK W/DYE: CPT

## 2023-07-20 PROCEDURE — 70545 MR ANGIOGRAPHY HEAD W/DYE: CPT

## 2023-07-20 PROCEDURE — 255N000002 HC RX 255 OP 636: Mod: JZ | Performed by: STUDENT IN AN ORGANIZED HEALTH CARE EDUCATION/TRAINING PROGRAM

## 2023-07-20 PROCEDURE — 70450 CT HEAD/BRAIN W/O DYE: CPT

## 2023-07-20 PROCEDURE — 250N000011 HC RX IP 250 OP 636: Performed by: STUDENT IN AN ORGANIZED HEALTH CARE EDUCATION/TRAINING PROGRAM

## 2023-07-20 PROCEDURE — 84484 ASSAY OF TROPONIN QUANT: CPT | Performed by: STUDENT IN AN ORGANIZED HEALTH CARE EDUCATION/TRAINING PROGRAM

## 2023-07-20 PROCEDURE — 96361 HYDRATE IV INFUSION ADD-ON: CPT

## 2023-07-20 RX ORDER — MECLIZINE HCL 12.5 MG 12.5 MG/1
25 TABLET ORAL ONCE
Status: COMPLETED | OUTPATIENT
Start: 2023-07-20 | End: 2023-07-20

## 2023-07-20 RX ORDER — MECLIZINE HCL 12.5 MG 12.5 MG/1
25 TABLET ORAL 4 TIMES DAILY PRN
Qty: 15 TABLET | Refills: 0 | Status: SHIPPED | OUTPATIENT
Start: 2023-07-20

## 2023-07-20 RX ORDER — GADOBUTROL 604.72 MG/ML
10 INJECTION INTRAVENOUS ONCE
Status: COMPLETED | OUTPATIENT
Start: 2023-07-20 | End: 2023-07-20

## 2023-07-20 RX ORDER — LORAZEPAM 2 MG/ML
1 INJECTION INTRAMUSCULAR ONCE
Status: COMPLETED | OUTPATIENT
Start: 2023-07-20 | End: 2023-07-20

## 2023-07-20 RX ADMIN — MECLIZINE 25 MG: 12.5 TABLET ORAL at 16:25

## 2023-07-20 RX ADMIN — LORAZEPAM 1 MG: 2 INJECTION INTRAMUSCULAR; INTRAVENOUS at 16:24

## 2023-07-20 RX ADMIN — GADOBUTROL 10 ML: 604.72 INJECTION INTRAVENOUS at 18:35

## 2023-07-20 RX ADMIN — SODIUM CHLORIDE, POTASSIUM CHLORIDE, SODIUM LACTATE AND CALCIUM CHLORIDE 1000 ML: 600; 310; 30; 20 INJECTION, SOLUTION INTRAVENOUS at 16:25

## 2023-07-20 ASSESSMENT — ACTIVITIES OF DAILY LIVING (ADL)
ADLS_ACUITY_SCORE: 35

## 2023-07-20 NOTE — TELEPHONE ENCOUNTER
I returned the call to Bear to answer her questions. She is doing great after surgery and will follow up as needed.    no

## 2023-07-20 NOTE — ED NOTES
Bed: JNEDH-I  Expected date: 7/20/23  Expected time: 3:53 PM  Means of arrival: Ambulance  Comments:  Allyn 61 yo F vertigo

## 2023-07-20 NOTE — ED TRIAGE NOTES
Pt presents to [facility] ED alone via ems for dizziness. Pt has hx of vertigo about 3 years ago. Today was shopping and developed dizziness felt the room spinning. Similar to prev vertigo episode. 4 mg zofran given IM.

## 2023-07-20 NOTE — Clinical Note
Bear Obregon was seen and treated in our emergency department on 7/20/2023.  She may return to work on 07/24/2023.       If you have any questions or concerns, please don't hesitate to call.      Ohl, Carlos Mayfield, DO

## 2023-07-20 NOTE — ED PROVIDER NOTES
Emergency Department Encounter         FINAL IMPRESSION:  VERTIGO        ED COURSE AND MEDICAL DECISION MAKING       ED Course as of 07/20/23 2056   Thu Jul 20, 2023   1623 Patient is a 62-year-old history of hypertension, here from [facility] after acute onset of dizziness.  Patient reports the last 2 days she had intermittent dizzy episodes over this 1 has not persisted.  Increased room spinning with motion of her head.  Increased nausea with vomiting.  No chest pain or trouble breathing.  No abdominal pain.  No leg swelling.  No focal neurodeficits.  Arrival she looks uncomfortable however nontoxic.  Vitals are stable.  Neurologically grossly intact with no focality.  Cranial nerves intact.  She has no significant nystagmus appreciated.  No ear fullness or hearing difficulties.  No abdominal pain.  No paresthesias.  Plan for labs MRI and reevaluate.   1638 EKG sinus bradycardia 59 no signs of acute ischemia, no inversions no depressions QTc 491, unchanged from previous May 2020.     MRI normal.  Patient significantly improved with fluids and antiemetics and Ativan.  Plan for discharge home pending CT scan.      4:16 PM I met with the patient to obtain patient history and performed a physical exam. Discussed plan for ED work up including potential diagnostic studies and interventions.  8:56 PM Reevaluated and updated patient with findings.              Medical Decision Making    History:  Supplemental history from: Documented in chart, if applicable  External Record(s) reviewed: Documented in chart, if applicable.    Work Up:  Chart documentation includes differential considered and any EKGs or imaging independently interpreted by provider, where specified.  In additional to work up documented, I considered the following work up: Documented in chart, if applicable.    External consultation:  Discussion of management with another provider: Documented in chart, if applicable    Complicating factors:  Care impacted by  chronic illness: Hyperlipidemia and Hypertension  Care affected by social determinants of health: N/A    Disposition considerations: Discharge. I prescribed additional prescription strength medication(s) as charted. See documentation for any additional details.                  EKG  EKG sinus bradycardia 59 no signs of acute ischemia, no inversions no depressions QTc 491, unchanged from previous May 2020.      Critical Care     Performed by: Carlos Yadav or    Authorized by: Carlos Yadav  Total critical care time:  minutes  Critical care was necessary to treat or prevent imminent or life-threatening deterioration of the following conditions:   Critical care was time spent personally by me on the following activities: development of treatment plan with patient or surrogate, discussions with consultants, examination of patient, evaluation of patient's response to treatment, obtaining history from patient or surrogate, ordering and performing treatments and interventions, ordering and review of laboratory studies, ordering and review of radiographic studies, re-evaluation of patient's condition and monitoring for potential decompensation.  Critical care time was exclusive of separately billable procedures and treating other patients.'    At the conclusion of the encounter I discussed the results of all the tests and the disposition. The questions were answered. The patient or family acknowledged understanding and was agreeable with the care plan.                  MEDICATIONS GIVEN IN THE EMERGENCY DEPARTMENT:  Medications   lactated ringers BOLUS 1,000 mL (1,000 mLs Intravenous $New Bag 7/20/23 1625)   meclizine (ANTIVERT) tablet 25 mg (25 mg Oral $Given 7/20/23 1625)   LORazepam (ATIVAN) injection 1 mg (1 mg Intravenous $Given 7/20/23 1624)   gadobutrol (GADAVIST) injection 10 mL (10 mLs Intravenous $Given 7/20/23 1835)       NEW PRESCRIPTIONS STARTED AT TODAY'S ED VISIT:  New Prescriptions    No medications on file       HPI  "    Patient information obtained from: patient    Use of Interpretor: N/A    Bear Obregon is a 62 year old female with a pertinent history of vertigo, HTN, HLD, who presents to this ED via EMS for evaluation of dizziness.    Patient reports at 2 PM today, she was at [employer] shopping when she felt sudden onset persistent dizziness (\"room spinning\"). She endorses history of vertigo about 4-5 years ago and states that it's similar to then. Her symptoms are worse with head movements. She associates some nausea. She notes that she's been having some intermittent dizziness for the past few days which resolved on its own. She was given 4 mg of Zofran en route.     She otherwise denies associating vomiting, chest pain, and shortness of breath. There were no other concerns/complaints at this time.    SHx: She is a former smoker.    REVIEW OF SYSTEMS:  Review of Systems   Constitutional: Negative for fever, malaise  HEENT: Negative runny nose, sore throat, ear pain, neck pain  Respiratory: Negative for shortness of breath, cough, congestion  Cardiovascular: Negative for chest pain, leg edema  Gastrointestinal: Endorses nausea. Negative for abdominal distention, abdominal pain, constipation, vomiting, diarrhea  Genitourinary: Negative for dysuria and hematuria.   Integument: Negative for rash, skin breakdown  Neurological: Endorses dizziness. Negative for paresthesias, weakness, headache.  Musculoskeletal: Negative for joint pain, joint swelling      All other systems reviewed and are negative.          MEDICAL HISTORY     Past Medical History:   Diagnosis Date    Anxiety     Arthritis     Depression     Depressive disorder     Depressive disorder, not elsewhere classified     Disorders of bursae and tendons in shoulder region, unspecified     Disorders of bursae and tendons in shoulder region, unspecified     HLD (hyperlipidemia)     Hypertension     Obesity     Other and unspecified hyperlipidemia 04/17/2007    Personal " history of urinary calculi     SBO (small bowel obstruction) (H) 2021    Superficial injury of cornea     Tobacco use disorder        Past Surgical History:   Procedure Laterality Date    APPENDECTOMY      ARTHROPLASTY KNEE Left     BIOPSY BREAST Right     benign    COLONOSCOPY N/A 2/15/2018    Procedure: COLONOSCOPY;  Surgeon: Nikita Pagan III, MD;  Location: [facility];  Service:     EXC SKIN BENIG 0.6-1CM FACE,FACIAL      benign facial tumor removed,left    HC REVISE MEDIAN N/CARPAL TUNNEL SURG      Description: Neuroplasty Decompression Median Nerve At Carpal Tunnel;  Recorded: 2013;    HYSTERECTOMY      HYSTERECTOMY, JORGE LUIS      Non-cancerous reasons.  Uterine adhesions.    LAPAROTOMY EXPLORATORY  2021    lysis of adhesions    LAPAROTOMY EXPLORATORY N/A 3/21/2021    Procedure: LAPAROTOMY, EXPLORATORY, WITH LYSIS OF ADHESIONS, REMOVAL OF FOREIGN BODY;  Surgeon: Sri Davidson MD;  Location: [facility];  Service: General    ROTATOR CUFF REPAIR RT/LT      Bilateral    SALPINGO OOPHORECTOMY,R/L/RAUL      Bilateral    Z APPENDECTOMY  1983    [facility]  DELIVERY ONLY      x 3    [facility]  DELIVERY ONLY      Description:  Section;  Recorded: 2013;    ZZC TOTAL ABDOM HYSTERECTOMY      Description: Hysterectomy;  Recorded: 2013;       Social History     Tobacco Use    Smoking status: Former     Packs/day: 0.50     Years: 15.00     Pack years: 7.50     Types: Cigarettes     Quit date: 9/3/2006     Years since quittin.8    Smokeless tobacco: Never   Vaping Use    Vaping Use: Never used   Substance Use Topics    Alcohol use: No    Drug use: No       amoxicillin-clavulanate (AUGMENTIN) 875-125 MG tablet  atorvastatin (LIPITOR) 80 MG tablet  cyclobenzaprine (FLEXERIL) 10 MG tablet  ezetimibe (ZETIA) 10 MG tablet  ibuprofen (ADVIL/MOTRIN) 600 MG tablet  ibuprofen (ADVIL/MOTRIN) 600 MG tablet  ibuprofen (ADVIL/MOTRIN) 800 MG tablet  losartan (COZAAR) 25 MG  tablet  omeprazole (PRILOSEC) 20 MG CR capsule  sertraline (ZOLOFT) 50 MG tablet            PHYSICAL EXAM     BP (!) 150/80   Pulse 67   Temp 97.9  F (36.6  C) (Oral)   Resp 14   SpO2 92%       PHYSICAL EXAM:     General: Patient appears well, nontoxic, uncomfortable  HEENT: Moist mucous membranes,  No head trauma.    Cardiovascular: Normal rate, normal rhythm, no extremity edema.  No appreciable murmur.  Respiratory: No signs of respiratory distress, lungs are clear to auscultation bilaterally with no wheezes rhonchi or rales.  Abdominal: Soft, nontender, nondistended, no palpable masses, no guarding, no rebound  Musculoskeletal: Full range of motion of joints, no deformities appreciated.  Neurological: Alert and oriented, grossly neurologically intact with no focality. Cranial nerves intact. She has no significant nystagmus appreciated. No paresthesias.    Psychological: Normal affect and mood.  Integument: No rashes appreciated          RESULTS       Labs Ordered and Resulted from Time of ED Arrival to Time of ED Departure   COMPREHENSIVE METABOLIC PANEL - Abnormal       Result Value    Sodium 142      Potassium 3.6      Chloride 105      Carbon Dioxide (CO2) 26      Anion Gap 11      Urea Nitrogen 10.5      Creatinine 0.62      Calcium 9.6      Glucose 147 (*)     Alkaline Phosphatase 96      AST 26      ALT 16      Protein Total 7.6      Albumin 4.3      Bilirubin Total 0.2      GFR Estimate >90     CBC WITH PLATELETS AND DIFFERENTIAL - Abnormal    WBC Count 12.2 (*)     RBC Count 4.79      Hemoglobin 13.6      Hematocrit 44.4      MCV 93      MCH 28.4      MCHC 30.6 (*)     RDW 13.0      Platelet Count 250      % Neutrophils 71      % Lymphocytes 21      % Monocytes 6      % Eosinophils 1      % Basophils 0      % Immature Granulocytes 1      NRBCs per 100 WBC 0      Absolute Neutrophils 8.8 (*)     Absolute Lymphocytes 2.5      Absolute Monocytes 0.7      Absolute Eosinophils 0.1      Absolute Basophils  0.0      Absolute Immature Granulocytes 0.1      Absolute NRBCs 0.0     TROPONIN T, HIGH SENSITIVITY - Normal    Troponin T, High Sensitivity <6         MR Brain w/o & w Contrast    (Results Pending)   MRA Brain ([place]) w Contrast    (Results Pending)   MRA Neck (Carotids) w Contrast    (Results Pending)                     PROCEDURES:  Procedures:  Procedures       I, Krystal Pastrana am serving as a scribe to document services personally performed by Carlos Yadav DO, based on my observations and the provider's statements to me.  I, Carlos Yadav DO, attest that Krystal Pastrana is acting in a scribe capacity, has observed my performance of the services and has documented them in accordance with my direction.    Carlos Yadav DO  Emergency Medicine  [facility] EMERGENCY DEPARTMENT     Carlos Yadav DO  07/24/23 3696

## 2023-07-21 ENCOUNTER — PATIENT OUTREACH (OUTPATIENT)
Dept: CARE COORDINATION | Facility: CLINIC | Age: 63
End: 2023-07-21
Payer: COMMERCIAL

## 2023-07-21 VITALS
TEMPERATURE: 97.9 F | DIASTOLIC BLOOD PRESSURE: 59 MMHG | OXYGEN SATURATION: 98 % | BODY MASS INDEX: 35.15 KG/M2 | HEART RATE: 63 BPM | SYSTOLIC BLOOD PRESSURE: 162 MMHG | WEIGHT: 191 LBS | RESPIRATION RATE: 14 BRPM | HEIGHT: 62 IN

## 2023-07-21 LAB
ATRIAL RATE - MUSE: 59 BPM
DIASTOLIC BLOOD PRESSURE - MUSE: 65 MMHG
INTERPRETATION ECG - MUSE: NORMAL
P AXIS - MUSE: 61 DEGREES
PR INTERVAL - MUSE: 170 MS
QRS DURATION - MUSE: 80 MS
QT - MUSE: 496 MS
QTC - MUSE: 491 MS
R AXIS - MUSE: 33 DEGREES
SYSTOLIC BLOOD PRESSURE - MUSE: 131 MMHG
T AXIS - MUSE: 43 DEGREES
VENTRICULAR RATE- MUSE: 59 BPM

## 2023-07-21 PROCEDURE — 250N000013 HC RX MED GY IP 250 OP 250 PS 637: Performed by: EMERGENCY MEDICINE

## 2023-07-21 RX ORDER — MECLIZINE HCL 12.5 MG 12.5 MG/1
25 TABLET ORAL ONCE
Status: COMPLETED | OUTPATIENT
Start: 2023-07-21 | End: 2023-07-21

## 2023-07-21 RX ADMIN — MECLIZINE 25 MG: 12.5 TABLET ORAL at 00:23

## 2023-07-21 NOTE — PROGRESS NOTES
Faculty Supervision of Residents   I have examined this patient and the medical care has been evaluated and discussed with the resident. See resident note outlining our discussion.      Marianna Amato MD

## 2023-07-21 NOTE — ED NOTES
EMERGENCY DEPARTMENT SIGN OUT NOTE        ED COURSE AND MEDICAL DECISION MAKING  Patient was signed out to me by Dr Carlos Yadav at end of shift    Patient evaluated for vertigo.  History physical and work-up are consistent with peripheral vertigo    CT head ordered for nonspecific finding.  Head CT did not show any serious finding.    Results were discussed with patient.  At this time she feels fine and is asymptomatic.    She will be discharged home with prescription medications and work note      FINAL IMPRESSION    1. Vertigo        ED MEDS  Medications   meclizine (ANTIVERT) tablet 25 mg (has no administration in time range)   lactated ringers BOLUS 1,000 mL (0 mLs Intravenous Stopped 7/20/23 2117)   meclizine (ANTIVERT) tablet 25 mg (25 mg Oral $Given 7/20/23 1625)   LORazepam (ATIVAN) injection 1 mg (1 mg Intravenous $Given 7/20/23 1624)   gadobutrol (GADAVIST) injection 10 mL (10 mLs Intravenous $Given 7/20/23 1835)       LAB  Labs Ordered and Resulted from Time of ED Arrival to Time of ED Departure   COMPREHENSIVE METABOLIC PANEL - Abnormal       Result Value    Sodium 142      Potassium 3.6      Chloride 105      Carbon Dioxide (CO2) 26      Anion Gap 11      Urea Nitrogen 10.5      Creatinine 0.62      Calcium 9.6      Glucose 147 (*)     Alkaline Phosphatase 96      AST 26      ALT 16      Protein Total 7.6      Albumin 4.3      Bilirubin Total 0.2      GFR Estimate >90     CBC WITH PLATELETS AND DIFFERENTIAL - Abnormal    WBC Count 12.2 (*)     RBC Count 4.79      Hemoglobin 13.6      Hematocrit 44.4      MCV 93      MCH 28.4      MCHC 30.6 (*)     RDW 13.0      Platelet Count 250      % Neutrophils 71      % Lymphocytes 21      % Monocytes 6      % Eosinophils 1      % Basophils 0      % Immature Granulocytes 1      NRBCs per 100 WBC 0      Absolute Neutrophils 8.8 (*)     Absolute Lymphocytes 2.5      Absolute Monocytes 0.7      Absolute Eosinophils 0.1      Absolute Basophils 0.0      Absolute Immature  Granulocytes 0.1      Absolute NRBCs 0.0     TROPONIN T, HIGH SENSITIVITY - Normal    Troponin T, High Sensitivity <6           RADIOLOGY    Head CT w/o contrast   Final Result   IMPRESSION:   1.  No acute intracranial process.   2.  No gross abnormality of the orbits or definite visualized abnormality in the calvarium.      MRA Neck (Carotids) wo & w Contrast   Final Result   IMPRESSION:   HEAD MRI:    1.  No discrete mass lesion, hemorrhage or focal area suggestive of acute ischemia.   2.  Minimal age related changes.   3.  Two nonspecific areas of calvarial enhancement on right side. Recommend CT head for further evaluation.   4.  Abnormal signal and enhancement of the retrobulbar fat left orbit region. This most likely is related to dental hardware artifact but recommend a CT orbits for further evaluation.      HEAD MRA:    1.  No discrete vessel occlusion, significant stenosis, aneurysm or high flow vascular malformation involving the arteries of the [place].      NECK MRA:   1.  Normal configuration of the great vessels off the aortic arch with no significant stenosis of their origins.   2.  No significant stenosis or irregularity involving the arteries of the neck.      MRA Brain ([place]) w Contrast   Final Result   IMPRESSION:   HEAD MRI:    1.  No discrete mass lesion, hemorrhage or focal area suggestive of acute ischemia.   2.  Minimal age related changes.   3.  Two nonspecific areas of calvarial enhancement on right side. Recommend CT head for further evaluation.   4.  Abnormal signal and enhancement of the retrobulbar fat left orbit region. This most likely is related to dental hardware artifact but recommend a CT orbits for further evaluation.      HEAD MRA:    1.  No discrete vessel occlusion, significant stenosis, aneurysm or high flow vascular malformation involving the arteries of the [place].      NECK MRA:   1.  Normal configuration of the great vessels off the aortic  arch with no significant stenosis of their origins.   2.  No significant stenosis or irregularity involving the arteries of the neck.      MR Brain w/o & w Contrast   Final Result   IMPRESSION:   HEAD MRI:    1.  No discrete mass lesion, hemorrhage or focal area suggestive of acute ischemia.   2.  Minimal age related changes.   3.  Two nonspecific areas of calvarial enhancement on right side. Recommend CT head for further evaluation.   4.  Abnormal signal and enhancement of the retrobulbar fat left orbit region. This most likely is related to dental hardware artifact but recommend a CT orbits for further evaluation.      HEAD MRA:    1.  No discrete vessel occlusion, significant stenosis, aneurysm or high flow vascular malformation involving the arteries of the [place].      NECK MRA:   1.  Normal configuration of the great vessels off the aortic arch with no significant stenosis of their origins.   2.  No significant stenosis or irregularity involving the arteries of the neck.          DISCHARGE MEDS  New Prescriptions    MECLIZINE (ANTIVERT) 12.5 MG TABLET    Take 2 tablets (25 mg) by mouth 4 times daily as needed for dizziness       Tomas Poe MD  [facility] EMERGENCY DEPARTMENT  [address] [place] [id number]  [phone]     Tomas Poe MD  07/21/23 0012

## 2023-07-21 NOTE — ED NOTES
Pt resting on bed. Denies dizziness at this point. However stated dizziness when she stands up. Vital sign retaken. Provided with food. Denies sob, chest pain, and n/v. Waiting for the result.

## 2023-07-24 ENCOUNTER — PATIENT OUTREACH (OUTPATIENT)
Dept: CARE COORDINATION | Facility: CLINIC | Age: 63
End: 2023-07-24
Payer: COMMERCIAL

## 2023-07-24 NOTE — PROGRESS NOTES
Clinic Care Coordination Contact  Follow Up Progress Note      Assessment:  The pt was recently in the ED, I called to check up on the pt and help the pt setup a ED follow up. The pt was at [facility] for dizziness. I called the pt,but got her vm, so I left a vm for the pt to give me a call back.      Care Gaps:    Health Maintenance Due   Topic Date Due    ZOSTER IMMUNIZATION (1 of 2) Never done    COVID-19 Vaccine (5 - Moderna series) 08/22/2022    MEDICARE ANNUAL WELLNESS VISIT  03/16/2023           Care Plans      Intervention/Education provided during outreach:             Plan:     Care Coordinator will follow up in

## 2023-07-25 ENCOUNTER — PATIENT OUTREACH (OUTPATIENT)
Dept: CARE COORDINATION | Facility: CLINIC | Age: 63
End: 2023-07-25
Payer: COMMERCIAL

## 2023-07-25 NOTE — PROGRESS NOTES
Clinic Care Coordination Contact  Follow Up Progress Note      Assessment: The pt was recently in the ED, I called to check up on the pt and help the pt setup a ED follow up. The pt was at  [facility] for dizziness. I called and talked to the pt, pt stated that she is doing better.  She stated that she is coming tomorrow at 8:00am to see .    Care Gaps:    Health Maintenance Due   Topic Date Due    ZOSTER IMMUNIZATION (1 of 2) Never done    COVID-19 Vaccine (5 - Moderna series) 08/22/2022    MEDICARE ANNUAL WELLNESS VISIT  03/16/2023           Care Plans      Intervention/Education provided during outreach:               Plan:     Care Coordinator will follow up in

## 2023-07-26 ENCOUNTER — OFFICE VISIT (OUTPATIENT)
Dept: FAMILY MEDICINE | Facility: CLINIC | Age: 63
End: 2023-07-26
Payer: COMMERCIAL

## 2023-07-26 VITALS
SYSTOLIC BLOOD PRESSURE: 128 MMHG | WEIGHT: 190.04 LBS | HEART RATE: 62 BPM | HEIGHT: 62 IN | DIASTOLIC BLOOD PRESSURE: 76 MMHG | OXYGEN SATURATION: 98 % | BODY MASS INDEX: 34.97 KG/M2

## 2023-07-26 DIAGNOSIS — H81.399 VERTIGO, PERIPHERAL, UNSPECIFIED LATERALITY: Primary | ICD-10-CM

## 2023-07-26 PROCEDURE — 91312 COVID-19 BIVALENT 12+ (PFIZER): CPT

## 2023-07-26 PROCEDURE — 99214 OFFICE O/P EST MOD 30 MIN: CPT | Mod: 25

## 2023-07-26 PROCEDURE — 0121A COVID-19 BIVALENT 12+ (PFIZER): CPT

## 2023-07-26 RX ORDER — MECLIZINE HYDROCHLORIDE 25 MG/1
25 TABLET ORAL 3 TIMES DAILY PRN
Qty: 60 TABLET | Refills: 1 | Status: SHIPPED | OUTPATIENT
Start: 2023-07-26

## 2023-07-26 NOTE — PROGRESS NOTES
Assessment and Plan:  #Peripheral Vertigo  -Refilled Meclizine PRN TID for vertigo  -Referral to vestibular PT  -Discussed safety concerns around the house if symptoms reappear to avoid falls    #Health Maintenance  -Covid booster today  -Follow up for annual wellness visit in 2 weeks  -Will administer zoster vaccine at that time    Milagros Sifuentes is a 62 year old, presenting for the following health issues:  No chief complaint on file.       No data to display              Bear Obregon is a 61 y/o F PMH of HTN and Hypercholesterolemia who presents to clinic today for follow up of an ER visit on 7/20 for which she was seen for vertigo, nausea and vomiting. At the time she received meclizine for symptom relief and extensive imaging workup was performed, including head MRI and CT which was not concerning for central causes of vertigo. No positional changes noted that induce vertigo. Denies any symptoms since she left the ER after treatment. No dizziness, vertigo, chest pain, SOB, syncope, or headaches and blurry vision today.    Medication Refill         Review of Systems   All other systems reviewed and are negative.           Objective    There were no vitals taken for this visit.  There is no height or weight on file to calculate BMI.  Physical Exam  Constitutional:       Appearance: Normal appearance.   Eyes:      Extraocular Movements: Extraocular movements intact.      Conjunctiva/sclera: Conjunctivae normal.      Pupils: Pupils are equal, round, and reactive to light.   Cardiovascular:      Rate and Rhythm: Normal rate and regular rhythm.      Pulses: Normal pulses.      Heart sounds: Normal heart sounds.   Pulmonary:      Effort: Pulmonary effort is normal.      Breath sounds: Normal breath sounds.   Abdominal:      General: Bowel sounds are normal.      Palpations: Abdomen is soft.   Musculoskeletal:         General: Normal range of motion.   Skin:     General: Skin is warm.   Neurological:       General: No focal deficit present.      Mental Status: She is alert and oriented to person, place, and time. Mental status is at baseline.   Psychiatric:         Mood and Affect: Mood normal.         Behavior: Behavior normal.     Miguel Palomino MD  PGY-1  [facility] Medicine Residency  [facility]  July 26, 2023

## 2023-07-26 NOTE — NURSING NOTE
Prior to immunization administration, verified patients identity using patient s name and date of birth. Please see Immunization Activity for additional information.     Screening Questionnaire for Adult Immunization    Are you sick today?   No     Do you have allergies to medications, food, a vaccine component or latex?   No   Have you ever had a serious reaction after receiving a vaccination?   No   Do you have a long-term health problem with heart, lung, kidney, or metabolic disease (e.g., diabetes), asthma, a blood disorder, no spleen, complement component deficiency, a cochlear implant, or a spinal fluid leak?  Are you on long-term aspirin therapy?   No   Do you have cancer, leukemia, HIV/AIDS, or any other immune system problem?   No   Do you have a parent, brother, or sister with an immune system problem?   No   In the past 3 months, have you taken medications that affect  your immune system, such as prednisone, other steroids, or anticancer drugs; drugs for the treatment of rheumatoid arthritis, Crohn s disease, or psoriasis; or have you had radiation treatments?   No   Have you had a seizure, or a brain or other nervous system problem?   No   During the past year, have you received a transfusion of blood or blood    products, or been given immune (gamma) globulin or antiviral drug?   No   For women: Are you pregnant or is there a chance you could become       pregnant during the next month?   No   Have you received any vaccinations in the past 4 weeks?   No     Immunization questionnaire answers were all negative.      Patient instructed to remain in clinic for 15 minutes afterwards, and to report any adverse reactions.     Screening performed by Jaclyn Pillai MA on 7/26/2023 at 9:26 AM.

## 2023-07-26 NOTE — PROGRESS NOTES
Preceptor Attestation:  Patient's case reviewed and discussed with Miguel Palomino MD resident and I evaluated the patient. I agree with written assessment and plan of care.  Supervising Physician:  Daniel Vazquez MD, MD [facility]

## 2023-07-26 NOTE — PROGRESS NOTES
"  {PROVIDER CHARTING PREFERENCE:507336}    Milagros Sifuentes is a 62 year old, presenting for the following health issues:  [facility] Care Coordination - ED Follow-Up (Pt felt everything spinning and was then taken to ER. Pt states she was nauseous and felt like she was going to throw up) and Medication Refill (Meclizine)      7/26/2023     7:59 AM   Additional Questions   Roomed by Ety   Accompanied by Self     HPI     {SUPERLIST (Optional):733041}  {additonal problems for provider to add (Optional):562744}      Review of Systems   {ROS COMP (Optional):527818}      Objective    /76 (BP Location: Right arm, Patient Position: Sitting, Cuff Size: Adult Large)   Pulse 62   Ht 1.587 m (5' 2.48\")   Wt 86.2 kg (190 lb 0.6 oz)   SpO2 98%   BMI 34.23 kg/m    Body mass index is 34.23 kg/m .  Physical Exam   {Exam List (Optional):026335}    {Diagnostic Test Results (Optional):379113}    {AMBULATORY ATTESTATION (Optional):173838}              "

## 2023-07-26 NOTE — PATIENT INSTRUCTIONS
Continue taking Meclizine as prescribed as needed for vertigo  Consult placed for vestibular physical therapy   Follow up with me in 2 weeks for annual wellness visit  Shingles vaccine at next follow up, covid booster today

## 2023-08-09 ENCOUNTER — OFFICE VISIT (OUTPATIENT)
Dept: FAMILY MEDICINE | Facility: CLINIC | Age: 63
End: 2023-08-09
Payer: COMMERCIAL

## 2023-08-09 ENCOUNTER — TELEPHONE (OUTPATIENT)
Dept: FAMILY MEDICINE | Facility: CLINIC | Age: 63
End: 2023-08-09

## 2023-08-09 VITALS
HEART RATE: 60 BPM | TEMPERATURE: 98.3 F | OXYGEN SATURATION: 96 % | SYSTOLIC BLOOD PRESSURE: 175 MMHG | BODY MASS INDEX: 34.02 KG/M2 | WEIGHT: 192 LBS | HEIGHT: 63 IN | DIASTOLIC BLOOD PRESSURE: 74 MMHG

## 2023-08-09 DIAGNOSIS — M17.0 PRIMARY OSTEOARTHRITIS OF BOTH KNEES: ICD-10-CM

## 2023-08-09 DIAGNOSIS — I10 ESSENTIAL HYPERTENSION: Primary | ICD-10-CM

## 2023-08-09 DIAGNOSIS — K11.20 SIALOADENITIS OF SUBMANDIBULAR GLAND: ICD-10-CM

## 2023-08-09 PROCEDURE — 99214 OFFICE O/P EST MOD 30 MIN: CPT | Mod: GC

## 2023-08-09 RX ORDER — IBUPROFEN 600 MG/1
600 TABLET, FILM COATED ORAL EVERY 8 HOURS PRN
Qty: 100 TABLET | Refills: 0 | Status: SHIPPED | OUTPATIENT
Start: 2023-08-09

## 2023-08-09 RX ORDER — LOSARTAN POTASSIUM 25 MG/1
37.5 TABLET ORAL DAILY
Qty: 90 TABLET | Refills: 1 | Status: SHIPPED | OUTPATIENT
Start: 2023-08-09 | End: 2023-12-20

## 2023-08-09 NOTE — PROGRESS NOTES
"  Assessment & Plan     (I10) Essential hypertension  (primary encounter diagnosis)  Comment: BP on last couple visits systolic 160's, feel that she is not currently adequately controlled. Will increase dose to 37.5 mg today and recheck in 2 weeks  Plan: losartan (COZAAR) 25 MG tablet        Take 1.5 tablets PO 1x/day for BP control      (M17.0) Primary osteoarthritis of both knees  Comment: Pt. Having worsening pain with walking with known history of knee OA s/p L knee arthroscopy.  Plan: diclofenac (VOLTAREN) 1 % topical gel  -Can also use lidocaine gel OTC  -Ibuprofen 600 mg PRN    #Vertigo, unspecified  Stable, continue taking Meclizine PRN  Has apt. With vestibular OT this week     BMI:   Estimated body mass index is 34.56 kg/m  as calculated from the following:    Height as of this encounter: 1.588 m (5' 2.5\").    Weight as of this encounter: 87.1 kg (192 lb).     Return in about 2 weeks (around 8/23/2023) for Medicare Wellness visit.    Miguel Palomino MD  [facility] [place] Subjective   Bear is a 62 year old, presenting for the following health issues:  Other (Follow up vertigo//No other concerns so far )      8/9/2023     8:07 AM   Additional Questions   Roomed by Mynor   Accompanied by Self       Bear is a 62 year old female following up today for vertigo. Meclizine and relaxing is helping when onset of vertigo symptoms appear. Does not need refills today. She is going to vestibular occupational therapy this week. Discussed zoster immunization, she states that it is too expensive to go to the pharmacy, wondering if it would be covered here in clinic since she has additional coverage with [employer]. She says she will call her insurance to find out.    Her knees have been bothering her, history of osteoarthritis s/p arthroscopic surgery on her L knee. Last x-ray was in 2020. Has had injections in the past, not interested in repeat injections today. using epsom salts for pain relief. " "Recommended quadriceps strengthening exercises. She walks for exercise 45 min-1 hour per day. Takes ibuprofen 600 mg. Says she used a lidocaine gel in the past which was effective.     Her blood pressure is elevated again today. Unsure if she is adequately controlled with her current dose of losartan.         Review of Systems   Constitutional, HEENT, cardiovascular, pulmonary, gi and gu systems are negative, except as otherwise noted.      Objective    BP (!) 167/89   Pulse 60   Temp 98.3  F (36.8  C) (Oral)   Ht 1.588 m (5' 2.5\")   Wt 87.1 kg (192 lb)   SpO2 96%   BMI 34.56 kg/m    Body mass index is 34.56 kg/m .  Physical Exam  Constitutional:       Appearance: Normal appearance.   Cardiovascular:      Rate and Rhythm: Normal rate and regular rhythm.      Pulses: Normal pulses.      Heart sounds: Normal heart sounds.   Pulmonary:      Effort: Pulmonary effort is normal.      Breath sounds: Normal breath sounds.   Abdominal:      General: Bowel sounds are normal.      Palpations: Abdomen is soft.   Musculoskeletal:         General: Normal range of motion.   Neurological:      General: No focal deficit present.      Mental Status: She is alert and oriented to person, place, and time.   Psychiatric:         Mood and Affect: Mood normal.         Behavior: Behavior normal.      Miguel Palomino MD  PGY-1  [facility]  [facility]   August 9, 2023               "

## 2023-08-09 NOTE — PATIENT INSTRUCTIONS
Lidocaine gel OTC at the pharmacy for knee pain  Prescribing voltaren gel 1% for knee pain  Increased losartan to 37.5 mg per day (1 and a half tablet)  Continue meclizine for vertigo  Follow up with me in 2 weeks for medicare annual wellness visit

## 2023-08-09 NOTE — TELEPHONE ENCOUNTER
Calling patient to schedule annual wellness per, she wants monings appointments only per orange slip. If patient calls back, please help schedule. Did leave detailed message. Thank you

## 2023-08-21 DIAGNOSIS — E78.00 PURE HYPERCHOLESTEROLEMIA: Primary | ICD-10-CM

## 2023-08-21 RX ORDER — ATORVASTATIN CALCIUM 80 MG/1
80 TABLET, FILM COATED ORAL DAILY
Qty: 90 TABLET | Refills: 3 | Status: SHIPPED | OUTPATIENT
Start: 2023-08-21

## 2023-10-18 ENCOUNTER — OFFICE VISIT (OUTPATIENT)
Dept: FAMILY MEDICINE | Facility: CLINIC | Age: 63
End: 2023-10-18
Payer: COMMERCIAL

## 2023-10-18 VITALS
SYSTOLIC BLOOD PRESSURE: 130 MMHG | WEIGHT: 186 LBS | RESPIRATION RATE: 18 BRPM | HEIGHT: 62 IN | TEMPERATURE: 98.1 F | DIASTOLIC BLOOD PRESSURE: 69 MMHG | BODY MASS INDEX: 34.23 KG/M2 | OXYGEN SATURATION: 97 % | HEART RATE: 65 BPM

## 2023-10-18 DIAGNOSIS — Z00.00 ENCOUNTER FOR MEDICARE ANNUAL WELLNESS EXAM: Primary | ICD-10-CM

## 2023-10-18 DIAGNOSIS — Z78.0 POSTMENOPAUSAL STATUS: ICD-10-CM

## 2023-10-18 DIAGNOSIS — Z00.00 WELLNESS EXAMINATION: Primary | ICD-10-CM

## 2023-10-18 PROCEDURE — 99207 PR NO BILLABLE SERVICE THIS VISIT: CPT

## 2023-10-18 PROCEDURE — 36415 COLL VENOUS BLD VENIPUNCTURE: CPT

## 2023-10-18 PROCEDURE — G0439 PPPS, SUBSEQ VISIT: HCPCS | Mod: GC

## 2023-10-18 PROCEDURE — 80061 LIPID PANEL: CPT

## 2023-10-18 ASSESSMENT — PATIENT HEALTH QUESTIONNAIRE - PHQ9
10. IF YOU CHECKED OFF ANY PROBLEMS, HOW DIFFICULT HAVE THESE PROBLEMS MADE IT FOR YOU TO DO YOUR WORK, TAKE CARE OF THINGS AT HOME, OR GET ALONG WITH OTHER PEOPLE: SOMEWHAT DIFFICULT
SUM OF ALL RESPONSES TO PHQ QUESTIONS 1-9: 8
10. IF YOU CHECKED OFF ANY PROBLEMS, HOW DIFFICULT HAVE THESE PROBLEMS MADE IT FOR YOU TO DO YOUR WORK, TAKE CARE OF THINGS AT HOME, OR GET ALONG WITH OTHER PEOPLE: SOMEWHAT DIFFICULT

## 2023-10-18 NOTE — PROGRESS NOTES
Preceptor Attestation:  Patient's case reviewed and discussed with Holly Lorenzana MD resident and I evaluated the patient. I agree with written assessment and plan of care.  Supervising Physician:  Daniel Vazquez MD, MD [facility]

## 2023-10-18 NOTE — PROGRESS NOTES
Annual Wellness Visit for 65 years and older    HPI  This 62 year old female presents as an established patient  Holly Lorenzana who presents for an annual wellness visit.    Other issues patient wants to be addressed today:   Chief Complaint   Patient presents with    Wellness Visit    Mass     Hard knot on back      Patient Active Problem List   Diagnosis    Hyperlipidemia    History of tobacco abuse    Class 2 obesity due to excess calories without serious comorbidity with body mass index (BMI) of 35.0 to 35.9 in adult    History of colonic polyps    Anxiety state    Disorder of bursae and tendons in shoulder region    Neck pain    Essential hypertension    Morbid obesity (H)     Past Medical History:   Diagnosis Date    Anxiety     Arthritis     Depression     Depressive disorder     Depressive disorder, not elsewhere classified     divorce, no meds    Disorders of bursae and tendons in shoulder region, unspecified     Disorders of bursae and tendons in shoulder region, unspecified     HLD (hyperlipidemia)     Hypertension     Obesity     Other and unspecified hyperlipidemia 04/17/2007    Personal history of urinary calculi     SBO (small bowel obstruction) (H) 03/21/2021    Formatting of this note might be different from the original. Added automatically from request for surgery [id number]    Superficial injury of cornea     left    Tobacco use disorder     quit       Family History   Problem Relation Age of Onset    Hypertension Mother     Heart Disease Mother     Hypertension Brother     Diabetes Brother     Hypertension Sister     Cancer Father     Heart Disease Father     Asthma No family hx of     C.A.D. No family hx of     Cerebrovascular Disease No family hx of     Breast Cancer No family hx of     Cancer - colorectal No family hx of     Prostate Cancer No family hx of      Problem List, Family History and past Medical History reviewed and  unchanged/updated.    There are no exam notes on file for this  visit.    Are you sexually active?  No   Any sexual concerns? No     FOR WOMEN  What year did you stop having periods? Early 40s  Any vaginal bleeding in the last year? No  Have you ever had an abnormal Pap smear? No      Frailty Assessment    1. Weight loss (>5% in year)  No  Wt Readings from Last 5 Encounters:   10/18/23 84.4 kg (186 lb)   08/09/23 87.1 kg (192 lb)   07/26/23 86.2 kg (190 lb 0.6 oz)   07/20/23 86.6 kg (191 lb)   07/18/23 86.6 kg (191 lb)       2. Exhaustion (perceived effort for a given activity)   How difficult is walking from one room to the other on the same level?not   No     3. Weakness (handgrip strength)   How difficult is lifting or carrying something as heavy as 10 pounds? not   No    4. Decreased physical activity    Compared with most (men/women) your age, would you say  that you are more active, less active, or about the same? more   No    5. Slow gait speed (timed up and go > 12 sec.)  No      10/18/2023    11:00 AM 12/8/2020     2:18 PM   FALL RISK ASSESSMENT   Fallen 2 or more times in the past year? No No   Any fall with injury in the past year? Yes No   Timed Up and Go Test/Seconds (13.5 is a fall risk; contact physician) 12 13     Frailty screen score: 0    Frail Assessment:0 Robust         EVALUATION OF COGNITIVE FUNCTION  Mood/affect:Normal  Appearance:Normal  Family member/caregiver input: Normal    PHQ-2 Score:       10/18/2023    10:25 AM 7/18/2023    10:03 AM   PHQ-2 ( 1999 Pfizer)   Q1: Little interest or pleasure in doing things 2    2 1   Q2: Feeling down, depressed or hopeless 1    1 1   PHQ-2 Score 3    3 2   Q1: Little interest or pleasure in doing things More than half the days    Q2: Feeling down, depressed or hopeless Several days    PHQ-2 Score 3        PHQ-9 Score:       3/16/2022    10:02 AM 6/27/2022     8:14 AM 10/18/2023    10:25 AM   [facility] Follow-up to PHQ   PHQ-9 9. Suicide Ideation past 2 weeks Not at all Not at all Not at all    Not at all       Mini Cog  Test:  Recall result:  2 points   Clock Draw Test result: Normal    Cognitive screen is: Negative      Other Assessments:  CV Risk based on Pooled Cohort Risk The 10-year ASCVD risk score (Zaire DK, et al., 2019) is: 8.4%    Values used to calculate the score:      Age: 62 years      Sex: Female      Is Non- : Yes      Diabetic: No      Tobacco smoker: No      Systolic Blood Pressure: 130 mmHg      Is BP treated: Yes      HDL Cholesterol: 73 mg/dL      Total Cholesterol: 205 mg/dL    Screening Lipid Level (covered every 5 years ): Recommended and patient accepted testing.  Cervical cancer screening:  Covered  until age 70 every 2 years unless high risk):  Testing not indicated  and history of history of total hysterectomy  HIV screening (at risk ):  Testing not indicated   Colon CA Screening (>50-75 ) (FITT annually or colonoscopy every 10years):  Testing not indicated  and Date done   Result(s) Normal  Breast CA Screenin-2 years 50-74years:  Testing not indicated  and Date done   Result(s) Normal and Dexa Scan (>65 yrs) (covered every 2 years):  Recommended and patient accepted testing.    ECG (if done)not performed    Corrected Visual acuity: Not done. Has an eye doctor she has seen in the past. Plans to see them soon for routine eye exam.       Advance Directives: Discussed with patient and family as appropriate. Has patient  completed advance directives and/or a living will? No -- given sheet and will bring to next appt      Immunization History   Administered Date(s) Administered    COVID-19 Bivalent 12+ (Pfizer) 2023    COVID-19 Monovalent 18+ (Moderna) 02/10/2021, 03/10/2021, 2022    COVID-19 Monovalent Booster 18+ (Moderna) 2022    FLU 6-35 months 2009, 10/01/2012, 2017    Influenza (H1N1) 2009    Influenza (IIV3) PF 11/15/2011, 2013, 11/10/2014    Influenza Vaccine 18-64 (Flublok) 2022    Influenza Vaccine >6 months  "(Alfuria,Fluzone) 12/08/2020    Influenza,INJ,MDCK,PF,Quad >6mo(Flucelvax) 09/16/2022    Pneumococcal 23 valent 07/27/2011    TD,PF 7+ (Tenivac) 05/24/1996    TDAP (Adacel,Boostrix) 07/18/2023    TDAP Vaccine (Adacel) 04/17/2007, 12/08/2009     Reviewed Immunization Record Today  Physical Exam    Vitals: /69   Pulse 65   Temp 98.1  F (36.7  C) (Oral)   Resp 18   Ht 1.585 m (5' 2.4\")   Wt 84.4 kg (186 lb)   SpO2 97%   BMI 33.58 kg/m    BMI= Body mass index is 33.58 kg/m .  EXAM:  GENERAL: Healthy, alert and no distress  EYES: Eyes grossly normal to inspection.  No discharge or erythema, or obvious scleral/conjunctival abnormalities.  RESP:  Lungs clear throughout. No wheeze or crackles.   CV: Heart RRR. No murmur  Abdomen: Soft, nontender, nondistended.  MSK: No gross deformity. Normal tone.  SKIN: Visible skin clear. No significant rash, abnormal pigmentation or lesions.  NEURO: Cranial nerves grossly intact.  Mentation and speech appropriate for age.  PSYCH: Mentation appears normal, affect normal/bright, judgement and insight intact, normal speech and appearance well-groomed.    Assessment and Plan:  Reviewed Preventive Services and Plan form with patient as specified in  Patient Instructions.  Positive findings on assessment: None   Zernia was seen today for wellness visit and mass.    Diagnoses and all orders for this visit:    Encounter for Medicare annual wellness exam  Patient doing well. No concerns with cognition, frailty. Discussed mood -- stable on Zoloft. Encouraged physical activity -- will look into Silver Sneakers. Discussed diet -- avoiding salt with hypertension. ACP docs given -- will bring back to next appt. Has an eye doctor that I encouraged her to see. Vaccine appt scheduled for next week. Due for Dexa scan and lipi panel. Otherwise up to date on remainder of items.   -     Lipid Panel; Future  -     Lipid Panel    Postmenopausal status  Due for dexa scan -- almost 65+. She will " get this scheduled in the next couple of months as she is not quite 63 yet.   -     Dexa hip/pelvis/spine*; Future    Concerned about knot on her back that she will follow-up on after discussing split billing. Follow-up appt was made.    Options for treatment and follow-up care were reviewed with the Bear Lobot  and/or guardian engaged in the decision making process and verbalized  understanding of the options discussed and agreed with the final plan.  Holly Lorenzana MD    Answers submitted by the patient for this visit:  Patient Health Questionnaire (Submitted on 10/18/2023)  If you checked off any problems, how difficult have these problems made it for you to do your work, take care of things at home, or get along with other people?: Somewhat difficult  PHQ9 TOTAL SCORE: 8

## 2023-10-18 NOTE — PATIENT INSTRUCTIONS
[email or web address] -- Free for medicare patients    Bring back Advance Care Directive      PERSONAL PREVENTIVE SERVICES PLAN - SERVICES     Review these tests with your medical staff then decide which ones you want and take this page home for your reference      SCREENING TESTS     Description   Year of Last Screening   Recommended Today?   Heart disease screening blood tests  Cholesterol level Reducing cholesterol can reduce your risk of heart attacks by 25%.  Screening is recommended yearly if you are at risk of heart disease otherwise every 4-5 years 8/19/22  On statin Yes; Recommended    Diabetes screening tests  Hemoglobin A1c blood test   Finding and treating diabetes early can reduce complications.  Screening recommended/covered yearly if you have high blood pressure, high cholesterol, obesity (BMI >30), or a history of high blood glucose tests; or 2 of the following: family history of diabetes, overweight (BMI >25 but <30), age 65 years or older, and a history of diabetes of pregnancy or gave birth to baby weighing more than 9 lbs. 12/8/20  hgbA1c  5.8 No: is not indicated today.   Hepatitis B screening Finding hepatitis B early can reduce complications.  Screening is recommended for persons with selected risk factors.  No: is not indicated today.   Hepatitis C screening Finding hepatitis C early can reduce complications.  Screening is recommended for all persons born from 1945 through 1965 and for those with selected other risk factors.  12/8/20  neg No: is not indicated today.   HIV screening Finding HIV early can reduce complications.  Screening is recommended for persons with risk factors for HIV infection. 3/16/22  neg No: is not indicated today.   Glaucoma screening Early detection of glaucoma can prevent blindness.   Please talk to your eye doctor about this.       SCREENING TESTS     Description   Year of Last Screening   Recommended Today?   Colorectal cancer screening  Fecal occult blood test    Screening colonoscopy Screening for colon cancer has been shown to reduce death from colon cancer by 25-30%. Screening recommended to start at 50 years and continuing until age 75 years.   2/15/18  normal DUE 2028   Breast Cancer Screening (women)  Mammogram Mammogram screening for breast cancer has been shown to reduce the risk of dying from breast cancer and prolong life. Screening is recommended every 1-2 years for women aged 50 to 74 years.  4/22/22  neg No: is not indicated today.   Cervical Cancer screening (women)  Pap Cervical pap smears can reduce cervical cancer. Screening is recommended annually if high risk (history of abnormal pap smears) otherwise every 2-3 years, stop screening at 65 years of age if history of normal paps. Hyst- uterine adhesions No: is not indicated today.   Screening for Osteoporosis:  Bone mass measurements (women)  Dexa Scan Screening and treating Osteoporosis can reduce the risk of hip and spine fractures. Screening is recommended in women 65 years or older and in women and men at risk of osteoporosis.  Yes; Recommended    Screening for Lung Cancer   Low-dose CT scanning Screening can reduce mortality in persons aged 55-80 who have smoked at least 30 pack-years and who are either still smoking or have quit in the past 15 years. 3/25/22  neg No: is not indicated today.   Abdominal Aortic Aneurysm (AAA) screening  Ultrasound (US)   An aneurysm treated before rupture is very safe -a ruptured aneurysm can be fatal.  Screening  by US for AAA is limited to patients who meet one of the following criteria:  Men who are 65-75 years old and have smoked more than 100 cigarettes in their lifetime  Anyone with a family history of abdominal aortic aneurysm  No: is not indicated today.     Here are your recommended immunizations.  Take this home for your reference.                                                    IMMUNIZATIONS Description Recommend today?     Influenza (Flu shot) Prevents  flu; should get every year Yes; Recommended    PCV 13 Pneumonia vaccination; you get it once Yes; Recommended   PPSV 23 Second pneumonia vaccination; usually get it 1 year after PCV 13 No; is up to date.   Zoster (Shingles) Prevents shingles; you get it once  (Check with Part D insurance for coverage, must receive at a pharmacy, not clinic) Yes; Recommended    Tetanus Prevents tetanus; once every 10 years No; is up to date.     Hepatitis B  If you have any of the following risk factors you should be immunized for hepatitis B: severe kidney disease, people who live in the same house as a carrier of Hepatitis B virus, people who live in  institutions (e.g. nursing homes or group homes), homosexual men, patients with hemophilia who received Factor VIII or IX concentrates, abusers of illicit injectable drugs No: is not indicated today.      PATIENT INSTRUCTIONS    Yearly exam:   See your health care provider every year in order to review changes in your health, review medicines that you take, and discuss preventive care needs such as immunizations and cancer screening.  Get a flu shot each year.     Advance Directives:  If you have not done so, you are encouraged to complete advance directives and/or a living will.   More information about advance directives can be found at: http://www.[place]med.org/advocacy/Key-Issues/Advance-Directives    Nutrition:   Eat at least 5 servings of fruits and vegetables each day.   Eat whole-grain bread, whole-wheat pasta and brown rice instead of white grains and rice.   Talk to your doctor about Calcium and Vitamin D.     Lifestyle:  Exercise for at least 150 minutes a week (30 minutes a day, 5 days a week). This will help you control your weight and prevent disease.   Limit alcohol to one drink per day.   If you smoke, try to quit - your doctor will be happy to help.   Wear sunscreen to prevent skin cancer.   See your dentist every six months for an exam and cleaning.   See your eye doctor  every 1 to 2 years to screen for conditions such as glaucoma, macular degeneration and cataracts.                            Patient Education   Personalized Prevention Plan  You are due for the preventive services outlined below.  Your care team is available to assist you in scheduling these services.  If you have already completed any of these items, please share that information with your care team to update in your medical record.  Health Maintenance Due   Topic Date Due    Zoster (Shingles) Vaccine (1 of 2) Never done    RSV VACCINE 60+ (1 - 1-dose 60+ series) Never done    Flu Vaccine (1) 09/01/2023    COVID-19 Vaccine (6 - 2023-24 season) 09/20/2023     Learning About Depression Screening  What is depression screening?  Depression screening is a way to see if you have depression symptoms. It may be done by a doctor or counselor. It's often part of a routine checkup. That's because your mental health is just as important as your physical health.  Depression is a mental health condition that affects how you feel, think, and act. You may:  Have less energy.  Lose interest in your daily activities.  Feel sad and grouchy for a long time.  Depression is very common. It affects people of all ages.  Many things can lead to depression. Some people become depressed after they have a stroke or find out they have a major illness like cancer or heart disease. The death of a loved one or a breakup may lead to depression. It can run in families. Most experts believe that a combination of inherited genes and stressful life events can cause it.  What happens during screening?  You may be asked to fill out a form about your depression symptoms. You and the doctor will discuss your answers. The doctor may ask you more questions to learn more about how you think, act, and feel.  What happens after screening?  If you have symptoms of depression, your doctor will talk to you about your options.  Doctors usually treat depression  "with medicines or counseling. Often, combining the two works best. Many people don't get help because they think that they'll get over the depression on their own. But people with depression may not get better unless they get treatment.  The cause of depression is not well understood. There may be many factors involved. But if you have depression, it's not your fault.  A serious symptom of depression is thinking about death or suicide. If you or someone you care about talks about this or about feeling hopeless, get help right away.  It's important to know that depression can be treated. Medicine, counseling, and self-care may help.  Where can you learn more?  Go to https://www.sourceasy.net/patiented  Enter T185 in the search box to learn more about \"Learning About Depression Screening.\"  Current as of: October 20, 2022               Content Version: 13.7    8971-9564 [employer].   Care instructions adapted under license by your healthcare professional. If you have questions about a medical condition or this instruction, always ask your healthcare professional. [employer] disclaims any warranty or liability for your use of this information.      Preventing Falls: Care Instructions    Talk to your doctor about the medicines you take. Ask if any of them increase the risk of falls and whether they can be changed or stopped.   Try to exercise regularly. It can help improve your strength and balance. This can help lower your risk of falling.     Practice fall safety and prevention.    Wear low-heeled shoes that fit well and give your feet good support. Talk to your doctor if you have foot problems that make this hard.  Carry a cellphone or wear a medical alert device that you can use to call for help.  Use stepladders instead of chairs to reach high objects. Don't climb if you're at risk for falls. Ask for help, if needed.  Wear the correct eyeglasses, if you need them.    Make your home " "safer.    Remove rugs, cords, clutter, and furniture from walkways.  Keep your house well lit. Use night-lights in hallways and bathrooms.  Install and use sturdy handrails on stairways.  Wear nonskid footwear, even inside. Don't walk barefoot or in socks without shoes.    Be safe outside.    Use handrails, curb cuts, and ramps whenever possible.  Keep your hands free by using a shoulder bag or backpack.  Try to walk in well-lit areas. Watch out for uneven ground, changes in pavement, and debris.  Be careful in the winter. Walk on the grass or gravel when sidewalks are slippery. Use de-icer on steps and walkways. Add non-slip devices to shoes.    Put grab bars and nonskid mats in your shower or tub and near the toilet. Try to use a shower chair or bath bench when bathing.   Get into a tub or shower by putting in your weaker leg first. Get out with your strong side first. Have a phone or medical alert device in the bathroom with you.   Where can you learn more?  Go to https://www.[employer].net/patiented  Enter G117 in the search box to learn more about \"Preventing Falls: Care Instructions.\"  Current as of: November 9, 2022               Content Version: 13.7    3397-2292 [employer].   Care instructions adapted under license by your healthcare professional. If you have questions about a medical condition or this instruction, always ask your healthcare professional. [employer] disclaims any warranty or liability for your use of this information.      How to Get Up Safely After a Fall: Care Instructions  Overview     If you have injuries, health problems, or other reasons that may make it easy for you to fall at home, it is a good idea to learn how to get up safely after a fall. Learning how to get up correctly can help you avoid making an injury worse.  Also, knowing what to do if you cannot get up can help you stay safe until help arrives.  Follow-up care is a key part of your treatment " and safety. Be sure to make and go to all appointments, and call your doctor if you are having problems. It's also a good idea to know your test results and keep a list of the medicines you take.  How can you care for yourself after a fall?  If you think you can get up  First lie still for a few minutes and think about how you feel. If your body feels okay and you think you can get up safely, follow the rest of the steps below:  Look for a chair or other piece of furniture that is close to you.  Roll onto your side and rest. Roll by turning your head in the direction you want to roll, move your shoulder and arm, then hip and leg in the same direction.  Lie still for a moment to let your blood pressure adjust.  Slowly push your upper body up, lift your head, and take a moment to rest.  Slowly get up on your hands and knees, and crawl to the chair or other stable piece of furniture.  Put your hands on the chair.  Move one foot forward, and place it flat on the floor. Your other leg should be bent with the knee on the floor.  Rise slowly, turn your body, and sit in the chair. Stay seated for a bit and think about how you feel. Call for help. Even if you feel okay, let someone know what happened to you. You might not know that you have a serious injury.  If you cannot get up  If you think you are injured after a fall or you cannot get up, try not to panic.  Call out for help.  If you have a phone within reach or you have an emergency call device, use it to call for help.  If you do not have a phone within reach, try to slide yourself toward it. If you cannot get to the phone, try to slide toward a door or window or a place where you think you can be heard.  [place] or use an object to make noise so someone might hear you.  If you can reach something that you can use for a pillow, place it under your head. Try to stay warm by covering yourself with a blanket or clothing while you wait for help.  When should you call for  "help?   Call 911 anytime you think you may need emergency care. For example, call if:    You passed out (lost consciousness).     You cannot get up after a fall.     You have severe pain.   Call your doctor now or seek immediate medical care if:    You have new or worse pain.     You are dizzy or lightheaded.     You hit your head.   Watch closely for changes in your health, and be sure to contact your doctor if:    You do not get better as expected.   Where can you learn more?  Go to https://www.[employer].net/patiented  Enter G513 in the search box to learn more about \"How to Get Up Safely After a Fall: Care Instructions.\"  Current as of: November 14, 2022               Content Version: 13.7    9202-6193 [employer].   Care instructions adapted under license by your healthcare professional. If you have questions about a medical condition or this instruction, always ask your healthcare professional. [employer] disclaims any warranty or liability for your use of this information.         Patient Education   Personalized Prevention Plan  You are due for the preventive services outlined below.  Your care team is available to assist you in scheduling these services.  If you have already completed any of these items, please share that information with your care team to update in your medical record.  Health Maintenance Due   Topic Date Due    Zoster (Shingles) Vaccine (1 of 2) Never done    RSV VACCINE 60+ (1 - 1-dose 60+ series) Never done    Flu Vaccine (1) 09/01/2023    COVID-19 Vaccine (6 - 2023-24 season) 09/20/2023     Learning About Depression Screening  What is depression screening?  Depression screening is a way to see if you have depression symptoms. It may be done by a doctor or counselor. It's often part of a routine checkup. That's because your mental health is just as important as your physical health.  Depression is a mental health condition that affects how you feel, think, and " "act. You may:  Have less energy.  Lose interest in your daily activities.  Feel sad and grouchy for a long time.  Depression is very common. It affects people of all ages.  Many things can lead to depression. Some people become depressed after they have a stroke or find out they have a major illness like cancer or heart disease. The death of a loved one or a breakup may lead to depression. It can run in families. Most experts believe that a combination of inherited genes and stressful life events can cause it.  What happens during screening?  You may be asked to fill out a form about your depression symptoms. You and the doctor will discuss your answers. The doctor may ask you more questions to learn more about how you think, act, and feel.  What happens after screening?  If you have symptoms of depression, your doctor will talk to you about your options.  Doctors usually treat depression with medicines or counseling. Often, combining the two works best. Many people don't get help because they think that they'll get over the depression on their own. But people with depression may not get better unless they get treatment.  The cause of depression is not well understood. There may be many factors involved. But if you have depression, it's not your fault.  A serious symptom of depression is thinking about death or suicide. If you or someone you care about talks about this or about feeling hopeless, get help right away.  It's important to know that depression can be treated. Medicine, counseling, and self-care may help.  Where can you learn more?  Go to https://www.[employer].net/patiented  Enter T185 in the search box to learn more about \"Learning About Depression Screening.\"  Current as of: October 20, 2022               Content Version: 13.7    4409-2607 [employer].   Care instructions adapted under license by your healthcare professional. If you have questions about a medical condition or this " instruction, always ask your healthcare professional. [employer] disclaims any warranty or liability for your use of this information.      Preventing Falls: Care Instructions    Talk to your doctor about the medicines you take. Ask if any of them increase the risk of falls and whether they can be changed or stopped.   Try to exercise regularly. It can help improve your strength and balance. This can help lower your risk of falling.     Practice fall safety and prevention.    Wear low-heeled shoes that fit well and give your feet good support. Talk to your doctor if you have foot problems that make this hard.  Carry a cellphone or wear a medical alert device that you can use to call for help.  Use stepladders instead of chairs to reach high objects. Don't climb if you're at risk for falls. Ask for help, if needed.  Wear the correct eyeglasses, if you need them.    Make your home safer.    Remove rugs, cords, clutter, and furniture from walkways.  Keep your house well lit. Use night-lights in hallways and bathrooms.  Install and use sturdy handrails on stairways.  Wear nonskid footwear, even inside. Don't walk barefoot or in socks without shoes.    Be safe outside.    Use handrails, curb cuts, and ramps whenever possible.  Keep your hands free by using a shoulder bag or backpack.  Try to walk in well-lit areas. Watch out for uneven ground, changes in pavement, and debris.  Be careful in the winter. Walk on the grass or gravel when sidewalks are slippery. Use de-icer on steps and walkways. Add non-slip devices to shoes.    Put grab bars and nonskid mats in your shower or tub and near the toilet. Try to use a shower chair or bath bench when bathing.   Get into a tub or shower by putting in your weaker leg first. Get out with your strong side first. Have a phone or medical alert device in the bathroom with you.   Where can you learn more?  Go to https://www.[employer].net/patiented  Enter G117 in the  "search box to learn more about \"Preventing Falls: Care Instructions.\"  Current as of: November 9, 2022               Content Version: 13.7    9904-7667 [employer].   Care instructions adapted under license by your healthcare professional. If you have questions about a medical condition or this instruction, always ask your healthcare professional. [employer] disclaims any warranty or liability for your use of this information.      How to Get Up Safely After a Fall: Care Instructions  Overview     If you have injuries, health problems, or other reasons that may make it easy for you to fall at home, it is a good idea to learn how to get up safely after a fall. Learning how to get up correctly can help you avoid making an injury worse.  Also, knowing what to do if you cannot get up can help you stay safe until help arrives.  Follow-up care is a key part of your treatment and safety. Be sure to make and go to all appointments, and call your doctor if you are having problems. It's also a good idea to know your test results and keep a list of the medicines you take.  How can you care for yourself after a fall?  If you think you can get up  First lie still for a few minutes and think about how you feel. If your body feels okay and you think you can get up safely, follow the rest of the steps below:  Look for a chair or other piece of furniture that is close to you.  Roll onto your side and rest. Roll by turning your head in the direction you want to roll, move your shoulder and arm, then hip and leg in the same direction.  Lie still for a moment to let your blood pressure adjust.  Slowly push your upper body up, lift your head, and take a moment to rest.  Slowly get up on your hands and knees, and crawl to the chair or other stable piece of furniture.  Put your hands on the chair.  Move one foot forward, and place it flat on the floor. Your other leg should be bent with the knee on the " "floor.  Rise slowly, turn your body, and sit in the chair. Stay seated for a bit and think about how you feel. Call for help. Even if you feel okay, let someone know what happened to you. You might not know that you have a serious injury.  If you cannot get up  If you think you are injured after a fall or you cannot get up, try not to panic.  Call out for help.  If you have a phone within reach or you have an emergency call device, use it to call for help.  If you do not have a phone within reach, try to slide yourself toward it. If you cannot get to the phone, try to slide toward a door or window or a place where you think you can be heard.  [place] or use an object to make noise so someone might hear you.  If you can reach something that you can use for a pillow, place it under your head. Try to stay warm by covering yourself with a blanket or clothing while you wait for help.  When should you call for help?   Call 911 anytime you think you may need emergency care. For example, call if:    You passed out (lost consciousness).     You cannot get up after a fall.     You have severe pain.   Call your doctor now or seek immediate medical care if:    You have new or worse pain.     You are dizzy or lightheaded.     You hit your head.   Watch closely for changes in your health, and be sure to contact your doctor if:    You do not get better as expected.   Where can you learn more?  Go to https://www."[employer]net/patiented  Enter G513 in the search box to learn more about \"How to Get Up Safely After a Fall: Care Instructions.\"  Current as of: November 14, 2022               Content Version: 13.7    7791-6875 [employer].   Care instructions adapted under license by your healthcare professional. If you have questions about a medical condition or this instruction, always ask your healthcare professional. [employer] disclaims any warranty or liability for your use of this information.         "

## 2023-10-18 NOTE — PROGRESS NOTES
Medicare Wellness Visit  Health Risk Assessment           Health Risk Assessment / Review of Systems     Constitutional: Any fevers or night sweats? No     Eyes:  Vision problems   No     Hearing Do you feel you have hearing loss?  No     Cardiovascular: Any chest pain, fast or irregular heart beat, calf pain with walking?     No           Respiratory:   Any breathing problems or cough?   No     Gastrointestinal: Any stomach or stool problems?   No      Genitourinary: Do you have difficulty controlling urination?   No     Muscles and Joints: Any joint stiffness or soreness?   No     Skin: Any concerning lesions or moles?   No     Nervous System: Any loss of strength or feeling, numbness or tingling, shaking, dizziness, or headache?  No     Mental Health: Any depression, anxiety or problems sleeping?    No, Sertaline has been effective.    Cognition: Do you have any problems with your memory?  No            Medical Care     What other specialists or organizations are involved in your medical care?  none  Patient Care Team         Relationship Specialty Notifications Start End    Holly Lorenzana MD PCP - General Family Medicine  7/1/22     Phone: [phone] Fax: [phone]         [facility] [address]    Holly Lorenzana MD Assigned PCP   7/9/22     Phone: [phone] Fax: [phone]         [facility] [address]            Have you been to the ER or overnight in the hospital in the last year?  No          Social History / Home Safety       Marital Status:Single  Who lives in your household? self    Do you feel threatened or controlled by a partner, ex-partner or anyone in your life? No     Has anyone hurt you physically, for example by pushing, hitting, slapping or kicking you   or forcing you to have sex? No          Does your home have any of the following safety concerns; loose rugs in the hallway,  bathrooms with no grab bars by the tub or toilet, stairs with no  handrails or poorly lit areas?   YES -no grab bars in the bathroom, feels safe at this time, no need for grab bars per Zernia.    Do you need help with dressing yourself, bathing, eating or getting around your home?  No     Do you need help with the phone, transportation, shopping, preparing meals, housework, laundry, medications or managing money?No       Risk Behaviors and Healthy Habits     History   Smoking Status    Former    Packs/day: 0.50    Years: 15.00    Types: Cigarettes    Quit date: 9/3/2006   Smokeless Tobacco    Never     How many servings of fruits and vegetables do you eat a day? 2-3 servings a day, reviewed daily intake recommendation and encouraged when able to increase intake.    Exercise: 6-7 days/week for an average of walking x 1 hour as weather permits.     Do you frequently drive without a seatbelt? No     Do you use tobacco?  No, quit in 2006    Do you use any other drugs? No         Do you use alcohol?No      Frailty Assessment            Have you lost 10 or more pounds unintentionally in the previous year? No     How difficult is walking from one room to the other on the same level?not       Is it difficult to lift or carry something as heavy as 10 pounds?not      Compared with most (men/women) your age, would you say  that you are more active, less active, or about the same? more            10/18/2023    11:00 AM 12/8/2020     2:18 PM   FALL RISK ASSESSMENT   Fallen 2 or more times in the past year? No No   Any fall with injury in the past year? Yes No   Timed Up and Go Test/Seconds (13.5 is a fall risk; contact physician) 12 13         Advance Directives:   Discussed with patient and family as appropriate. Has patient  completed advance directives and/or a living will? No, blank copy has been given to patient to bring home, review and complete it at her convenience.      Milana Puente RN    Answers submitted by the patient for this visit:  Patient Health Questionnaire (Submitted on  10/18/2023)  If you checked off any problems, how difficult have these problems made it for you to do your work, take care of things at home, or get along with other people?: Somewhat difficult  PHQ9 TOTAL SCORE: 8

## 2023-10-19 LAB
CHOLEST SERPL-MCNC: 211 MG/DL
HDLC SERPL-MCNC: 41 MG/DL
LDLC SERPL CALC-MCNC: 148 MG/DL
NONHDLC SERPL-MCNC: 170 MG/DL
TRIGL SERPL-MCNC: 112 MG/DL

## 2023-12-06 ENCOUNTER — OFFICE VISIT (OUTPATIENT)
Dept: FAMILY MEDICINE | Facility: CLINIC | Age: 63
End: 2023-12-06
Payer: COMMERCIAL

## 2023-12-06 VITALS
HEIGHT: 63 IN | SYSTOLIC BLOOD PRESSURE: 160 MMHG | TEMPERATURE: 97.6 F | BODY MASS INDEX: 32.78 KG/M2 | DIASTOLIC BLOOD PRESSURE: 69 MMHG | HEART RATE: 72 BPM | OXYGEN SATURATION: 97 % | WEIGHT: 185 LBS

## 2023-12-06 DIAGNOSIS — Z23 NEED FOR PROPHYLACTIC VACCINATION AND INOCULATION AGAINST INFLUENZA: ICD-10-CM

## 2023-12-06 DIAGNOSIS — I10 ESSENTIAL HYPERTENSION: ICD-10-CM

## 2023-12-06 DIAGNOSIS — R22.2 MASS ON BACK: Primary | ICD-10-CM

## 2023-12-06 DIAGNOSIS — E66.01 MORBID OBESITY (H): ICD-10-CM

## 2023-12-06 PROCEDURE — 90686 IIV4 VACC NO PRSV 0.5 ML IM: CPT

## 2023-12-06 PROCEDURE — 99214 OFFICE O/P EST MOD 30 MIN: CPT | Mod: 25

## 2023-12-06 PROCEDURE — G0008 ADMIN INFLUENZA VIRUS VAC: HCPCS

## 2023-12-06 NOTE — PROGRESS NOTES
Assessment & Plan     Mass on back  Roughly 5 x 2 cm mobile fluctuant mass within right paraspinal region in upper lumbar/lower thoracic region. Non tender, non draining and non erythematous. No known trauma. Present for 6 months and getting smaller. Was previously painful but this has resolved months ago. Reassured patient likely nothing life threatening given the improvement. Patient still interested in US for further classification. Possibly hematoma vs lipoma vs malignancy (less likely as it's getting smaller and less painful).  - US Soft Tissue Abdominal Wall or Lower Back; Future    Need for prophylactic vaccination and inoculation against influenza  Updated vaccination today.    Morbid obesity (H)  Continuing to work on weight loss with walking and watching what she eats. May be interested in medications in the future but not at this time.     Essential hypertension  Patient's BP elevated today. She thinks in the setting of recent stress today with drama with shopping and trying to return an item. Did take BP meds today. Will schedule follow-up in 2 weeks for a recheck.          Holly Lorenzana MD  [facility] Subjective Zernia is a 62 year old, presenting for the following health issues:  RECHECK (Knot on the lower back, with no pain. There was pain when it first started ) and Imm/Inj (Flu Shot)      12/6/2023     3:56 PM   Additional Questions   Roomed by coni EPPERSON   Mass on back:  -right lower back noticed a knot in muscle -- for about 6 months  -was close to softball size and now smaller like an egg  -previously had pain but now no longer   -unknown new activity  -unknown if there was any trauma or injury during time she first noticed lump  - has tried epsom salt soaks, massage  -no fevers or chills. Not draining. Not red.     Review of Systems   Constitutional, HEENT, cardiovascular, pulmonary, gi and gu systems are negative, except as otherwise noted.     "  Objective    BP (!) 160/69   Pulse 72   Temp 97.6  F (36.4  C)   Ht 1.6 m (5' 3\")   Wt 83.9 kg (185 lb)   SpO2 97%   BMI 32.77 kg/m    Body mass index is 32.77 kg/m .  Physical Exam   GENERAL: Healthy, alert and no distress  EYES: Eyes grossly normal to inspection.  No discharge or erythema, or obvious scleral/conjunctival abnormalities.  HENT: Normal cephalic/atraumatic.  External ears, nose and mouth without ulcers or lesions.  No nasal drainage visible.  RESP: No audible wheeze, cough, or visible cyanosis.  No visible retractions or increased work of breathing.    MSK: roughly 5 cm by 2 cm mobile fluctuant mass in upper lumber region of paraspinal muscle on right side. Non tender. Non erythematous.  SKIN: Visible skin clear. No significant rash, abnormal pigmentation or lesions.  NEURO: Cranial nerves grossly intact.  Mentation and speech appropriate for age.  PSYCH: Mentation appears normal, affect normal/bright, judgement and insight intact, normal speech and appearance well-groomed.        "

## 2023-12-06 NOTE — PROGRESS NOTES
Prior to immunization administration, verified patients identity using patient s name and date of birth. Please see Immunization Activity for additional information.     Screening Questionnaire for Adult Immunization    Are you sick today?   No   Do you have allergies to medications, food, a vaccine component or latex?   No   Have you ever had a serious reaction after receiving a vaccination?   No   Do you have a long-term health problem with heart, lung, kidney, or metabolic disease (e.g., diabetes), asthma, a blood disorder, no spleen, complement component deficiency, a cochlear implant, or a spinal fluid leak?  Are you on long-term aspirin therapy?   No   Do you have cancer, leukemia, HIV/AIDS, or any other immune system problem?   No   Do you have a parent, brother, or sister with an immune system problem?   No   In the past 3 months, have you taken medications that affect  your immune system, such as prednisone, other steroids, or anticancer drugs; drugs for the treatment of rheumatoid arthritis, Crohn s disease, or psoriasis; or have you had radiation treatments?   No   Have you had a seizure, or a brain or other nervous system problem?   No   During the past year, have you received a transfusion of blood or blood    products, or been given immune (gamma) globulin or antiviral drug?   No   For women: Are you pregnant or is there a chance you could become       pregnant during the next month?   No   Have you received any vaccinations in the past 4 weeks?   No     Immunization questionnaire answers were all negative.      Patient instructed to remain in clinic for 15 minutes afterwards, and to report any adverse reactions.     Screening performed by Ngoc Stein on 12/6/2023 at 4:10 PM.

## 2023-12-11 ENCOUNTER — IMMUNIZATION (OUTPATIENT)
Dept: FAMILY MEDICINE | Facility: CLINIC | Age: 63
End: 2023-12-11
Payer: COMMERCIAL

## 2023-12-11 DIAGNOSIS — Z23 HIGH PRIORITY FOR 2019-NCOV VACCINE: Primary | ICD-10-CM

## 2023-12-11 PROCEDURE — 90480 ADMN SARSCOV2 VAC 1/ONLY CMP: CPT

## 2023-12-11 PROCEDURE — 91320 SARSCV2 VAC 30MCG TRS-SUC IM: CPT

## 2023-12-11 PROCEDURE — 99207 PR NO CHARGE NURSE ONLY: CPT

## 2023-12-20 ENCOUNTER — OFFICE VISIT (OUTPATIENT)
Dept: FAMILY MEDICINE | Facility: CLINIC | Age: 63
End: 2023-12-20
Payer: COMMERCIAL

## 2023-12-20 VITALS
TEMPERATURE: 98.8 F | OXYGEN SATURATION: 98 % | HEIGHT: 64 IN | WEIGHT: 186 LBS | BODY MASS INDEX: 31.76 KG/M2 | SYSTOLIC BLOOD PRESSURE: 154 MMHG | DIASTOLIC BLOOD PRESSURE: 75 MMHG | HEART RATE: 61 BPM

## 2023-12-20 DIAGNOSIS — I10 ESSENTIAL HYPERTENSION: Primary | ICD-10-CM

## 2023-12-20 DIAGNOSIS — J43.9 PULMONARY EMPHYSEMA, UNSPECIFIED EMPHYSEMA TYPE (H): ICD-10-CM

## 2023-12-20 PROCEDURE — 99214 OFFICE O/P EST MOD 30 MIN: CPT | Mod: GC

## 2023-12-20 RX ORDER — LOSARTAN POTASSIUM 50 MG/1
50 TABLET ORAL DAILY
Qty: 90 TABLET | Refills: 0 | Status: SHIPPED | OUTPATIENT
Start: 2023-12-20 | End: 2024-03-08

## 2023-12-20 NOTE — PROGRESS NOTES
"  Assessment & Plan     Essential hypertension  Patient with ongoing elevation in BP. Will increase losartan to 50 mg today. Only taking 25 mg of losartan. Discussed DASH diet which she seems to be following pretty well. Limited salt intake. No fried foods. Cooks most meals at home.   - losartan (COZAAR) 50 MG tablet; Take 1 tablet (50 mg) by mouth daily  - check BP at home and bring sheet in at next appt  - BMP at next appt  - follow-up in 2 weeks     BMI:   Estimated body mass index is 32.35 kg/m  as calculated from the following:    Height as of this encounter: 1.615 m (5' 3.58\").    Weight as of this encounter: 84.4 kg (186 lb).   Weight management plan: Discussed healthy diet and exercise guidelines    Pulmonary Emphysema   Found on CT lung screening. Asymptomatic. Continues to not smoke. No longer qualifying for low dose CT as it has been over 15 years since she quit now.  Screening thus far without nodules.     No follow-ups on file.    Holly Lorenzana MD  [facility]    Milagros Sifuentes is a 62 year old, presenting for the following health issues:  Hypertension        12/20/2023     7:56 AM   Additional Questions   Roomed by coni EPPERSON   Follow-up hypertension:  -BP elevated at last visit --- she thought from stress  -ongoing situational stress with mother with shingles  -taking one tab -- 25mg losartan daily -- didn't take bp medication yet this morning  -no symptoms of elevated BP  -no salt in the house -- no fried foods, cooks home meals most often  -eating well balanced diet with lots of fruits and vegetables  -working on weight loss as well -- increasing her walking as able and adding more vegetables.      Review of Systems   Constitutional, HEENT, cardiovascular, pulmonary, gi and gu systems are negative, except as otherwise noted.      Objective    BP (!) 154/75   Pulse 61   Temp 98.8  F (37.1  C) (Oral)   Ht 1.615 m (5' 3.58\")   Wt 84.4 kg (186 lb)   " SpO2 98%   BMI 32.35 kg/m    Body mass index is 32.35 kg/m .  Physical Exam   GENERAL: Healthy, alert and no distress  EYES: Eyes grossly normal to inspection.  No discharge or erythema, or obvious scleral/conjunctival abnormalities.  RESP:  Lungs clear throughout. No wheeze or crackles.   CV: Heart RRR. No murmur  Abdomen: Soft, nontender, nondistended.  MSK: No gross deformity. Normal tone.  SKIN: Visible skin clear. No significant rash, abnormal pigmentation or lesions.  NEURO: Cranial nerves grossly intact.  Mentation and speech appropriate for age.  PSYCH: Mentation appears normal, affect normal/bright, judgement and insight intact, normal speech and appearance well-groomed.

## 2023-12-20 NOTE — PATIENT INSTRUCTIONS
"Here are some changes that you can make to your habits to help your blood pressure.     Follow the DASH dietary pattern. This can reduce systolic blood pressure by 11 mmgHg! To read more about this, I recommend Googling \"Dash diet\".   Decrease your dietary sodium (salt) intake. Decreasing by 25% or by 1,000 mg per day can reduce systolic blood pressure by 5 mmHg.  Increase potassium in your diet. You can do this by eating 4-5 servings of fruits and vegetables per day.   Exercise on a regular basis. This should total 150 minutes of aerobic activity per week (30 minutes, 5 days per week) can reduce systolic blood pressure by 5 mmHg.  Limit alcohol intake to one (women) or two (men) drinks per day.    "

## 2023-12-22 ENCOUNTER — HOSPITAL ENCOUNTER (OUTPATIENT)
Dept: BONE DENSITY | Facility: HOSPITAL | Age: 63
Discharge: HOME | End: 2023-12-22
Attending: FAMILY MEDICINE
Payer: COMMERCIAL

## 2023-12-22 ENCOUNTER — HOSPITAL ENCOUNTER (OUTPATIENT)
Dept: ULTRASOUND IMAGING | Facility: HOSPITAL | Age: 63
Discharge: HOME | End: 2023-12-22
Attending: FAMILY MEDICINE
Payer: COMMERCIAL

## 2023-12-22 DIAGNOSIS — R22.2 MASS ON BACK: ICD-10-CM

## 2023-12-22 DIAGNOSIS — Z78.0 POSTMENOPAUSAL STATUS: ICD-10-CM

## 2023-12-22 PROCEDURE — 76882 US LMTD JT/FCL EVL NVASC XTR: CPT | Mod: RT

## 2023-12-22 PROCEDURE — 77080 DXA BONE DENSITY AXIAL: CPT

## 2023-12-28 ENCOUNTER — PATIENT OUTREACH (OUTPATIENT)
Dept: GASTROENTEROLOGY | Facility: CLINIC | Age: 63
End: 2023-12-28
Payer: COMMERCIAL

## 2024-02-27 NOTE — PROGRESS NOTES
ASSESSMENT/PLAN:   Bear Obregon is a 61 year old female who presents to clinic today for the following health issues:    (M54.2) Neck pain on left side  (primary encounter diagnosis)  (M99.01) Somatic dysfunction of cervical region  Comment: Improved from last week, though persistent pain. Cervical somatic dysfunction noted of trapezius as on exam and procedure note.   Plan: performed OMT as below. She has medications available per last visit and does not request any others. She will establish with PT. Requests f/u OMT visit in 1 week.       OMT PROCEDURE NOTE    Body Region: Cervical     Discussed risks and benefits of OMT. She states understanding and agreeable to procedure as below.     Somatic Dysfunction #1: Left cervical somatic dysfunction (trapezius)   Treatment: counterstrain and Soft Tissue   Outcome: Improved. Started at 5/10 at beginning of visit. Improved to 2/10 following treatment.       Hailee Sandhu DO   [facility] Resident   Pager#[phone]    Precepted patient with Dr. Marianna Amato.     Options for treatment and follow-up care were reviewed with the patient and/or guardian. Bear Obregon and/or guardian engaged in the decision making process and verbalized understanding of the options discussed and agreed with the final plan      CHIEF COMPLAINT                                                      Chief Complaint   Patient presents with     Neck Pain     Left side neck pain - therapy     Medication Reconciliation     Completed      SUBJECTIVE:                                                    Bear Obregon is a 61 year old female who presents to clinic today for the following health issues:    Neck pain   Onset: 2/18/21 after sleeping on it the wrong way. No clear injury.    Course: Immediately worsened, prompting her to present to ED 2/20. Since then, pain has persisted but is overall improved from prior.    Location: left-sided lower lateral neck.   Radiation:  no   Description: Dull aching   Intensity: waxes and wanes between 5 and 7/10.   Exacerbating factors: movement or touching.   Remitting factors: mild relief with aleve, Flexeril, lidocaine patch, and stretches.  Planning to start PT on 3/16.   Of note, I had assessed the patient for possible OMT at visit 1 week ago, but she was significantly tender to light touch. Did not feel OMT was appropriate at that time.   Symptoms associated: Insomnia (resolved). Reports some mild left 4th and 5th finger numbness that has been chronic for years; no change with recent neck pain.   Denies: Arm pain, numbness, weakness, tingling.   ----------------------------------------------------------------------------------------------------------------------  Patient Active Problem List   Diagnosis     Hyperlipidemia     Tobacco use disorder     Obesity     Elevated blood pressure reading without diagnosis of hypertension     History of colonic polyps     Anxiety state     Disorder of bursae and tendons in shoulder region     Neck pain     Morbid obesity (H)    Past Surgical History:   Procedure Laterality Date     C APPENDECTOMY       C  DELIVERY ONLY      x 3     EXC SKIN BENIG 0.6-1CM FACE,FACIAL      benign facial tumor removed,left     HYSTERECTOMY, JORGE LUIS      Non-cancerous reasons.  Uterine adhesions.     ROTATOR CUFF REPAIR RT/LT      Bilateral     SALPINGO OOPHORECTOMY,R/L/RAUL      Bilateral       Social History     Social History Narrative     Not on file     Family History   Problem Relation Age of Onset     Hypertension Mother      Hypertension Brother      Diabetes Brother      Hypertension Sister      Asthma No family hx of      C.A.D. No family hx of      Cerebrovascular Disease No family hx of      Breast Cancer No family hx of      Cancer - colorectal No family hx of      Prostate Cancer No family hx of            No results found for this or any previous visit (from the past 24 hour(s)).  Problem list  "and past medical, surgical, social, and family histories reviewed & adjusted, as indicated.    Current Outpatient Medications   Medication Sig Dispense Refill     atorvastatin (LIPITOR) 80 MG tablet Take 1 tablet (80 mg) by mouth daily 90 tablet 3     carbamide peroxide (DEBROX) 6.5 % otic solution Place 5 drops into both ears 2 times daily 15 mL 0     cyclobenzaprine (FLEXERIL) 5 MG tablet Take 1 tablet (5 mg) by mouth 3 times daily as needed for muscle spasms 20 tablet 0     lidocaine (LIDODERM) 5 % patch Place 1 patch onto the skin every 24 hours To prevent lidocaine toxicity, patient should be patch free for 12 hrs daily. 15 patch 0     miconazole (MICATIN) 2 % external powder Apply topically as needed for itching or other (Use under both breasts) (Patient not taking: Reported on 3/1/2021) 43 g 0     omeprazole (PRILOSEC) 20 MG CR capsule Take 1 capsule (20 mg) by mouth daily (Patient not taking: Reported on 12/14/2020) 90 capsule 1     order for DME Equipment being ordered: Right wrist splint with thumb spica (Patient not taking: Reported on 11/19/2020) 1 Device 0     sertraline (ZOLOFT) 50 MG tablet Take 1 tablet (50 mg) by mouth daily 90 tablet 4     Medication list reviewed and updated as indicated.    Allergies   Allergen Reactions     Hydrocodone-Acetaminophen Itching     Oxycodone Itching     Sulfa Drugs Hives, Itching and Swelling     Hives and itching     Allergies reviewed and updated as indicated.  ----------------------------------------------------------------------------------------------------------------------  ROS:     ROS: 10 point ROS neg other than the symptoms noted above in the HPI.    OBJECTIVE:     BP (!) 144/75 (BP Location: Right arm, Patient Position: Sitting, Cuff Size: Adult Regular)   Pulse 67   Temp 97.6  F (36.4  C) (Oral)   Resp 20   Ht 1.581 m (5' 2.25\")   Wt 96.3 kg (212 lb 3.2 oz)   SpO2 97%   BMI 38.50 kg/m    Body mass index is 38.5 kg/m .    Physical Exam:   General: " Pleasant. Comfortable. No acute distress.   Cervical spine exam:  No focal tenderness is noted along spinous processes.  There is left sided tenderness of trapezius.  Range of Motion: forward flexion full , extension full , lateral rotation full , lateral flexion full.  Pain is increased with right lateral rotation.     OMT: 1-2 cm anterior trapezius tender point with increased tissue bogginess/tightness. No vertebral/segmental cervical/thoracic dysfunctions noted.              0 (no pain/absence of nonverbal indicators of pain)

## 2024-03-08 DIAGNOSIS — I10 ESSENTIAL HYPERTENSION: ICD-10-CM

## 2024-03-08 RX ORDER — LOSARTAN POTASSIUM 50 MG/1
50 TABLET ORAL DAILY
Qty: 90 TABLET | Refills: 0 | Status: SHIPPED | OUTPATIENT
Start: 2024-03-08

## 2024-03-08 NOTE — TELEPHONE ENCOUNTER
Message to physician:     Date of last visit: 12/20/2023    Date of next visit if scheduled:    Potassium   Date Value Ref Range Status   07/20/2023 3.6 3.4 - 5.3 mmol/L Final   03/31/2022 3.7 3.5 - 5.0 mmol/L Final   04/06/2021 3.7 3.4 - 5.3 mmol/L Final     Creatinine   Date Value Ref Range Status   07/20/2023 0.62 0.51 - 0.95 mg/dL Final   04/06/2021 0.5 (L) 0.6 - 1.3 mg/dL Final     GFR Estimate   Date Value Ref Range Status   07/20/2023 >90 >60 mL/min/1.73m2 Final   03/24/2021 >60 >60 mL/min/1.73m2 Final       BP Readings from Last 3 Encounters:   12/20/23 (!) 154/75   12/06/23 (!) 160/69   10/18/23 130/69       Hemoglobin A1C   Date Value Ref Range Status   12/08/2020 5.8 (H) <=5.6 % Final     Comment:     Prediabetes:   HBA1c       5.7 to 6.4%        Diabetes:        HBA1c        >=6.5%   Patients with Hgb F >5%, total bilirubin >10.0 mg/dL, abnormal red cell   turnover, severe renal or hepatic disease or malignancy should not have this   A1C method used to diagnose or monitor diabetes.             Please complete refill and CLOSE ENCOUNTER.  Closing the encounter signifies the refill is complete.

## 2024-05-15 DIAGNOSIS — F43.20 ADJUSTMENT DISORDER, UNSPECIFIED TYPE: ICD-10-CM

## 2024-05-15 NOTE — LETTER
05/15/24                            Erica Griffith  [address] [place] [id number]    To Whom It May Concern:    This is to certify Erica Griffith was evaluated with CHLOE Cox on 05/15/24 please excuse from school today.             Electronically signed by:  CHLOE Cox  [address] [address]  Dept Phone: [phone]        August 26, 2022      Bear Obregon  [address]  [facility] [address]      Hi Bear Obregon,     Thank you for visiting our clinic on Aug 19, 2022!     The result of your recent labwork has returned. Your cholesterol is elevated but improving. We will recheck again in 3 months after being on both your Lipitor and ezetimibe.       If you have any questions or concerns, please feel free to give us a call!       Thanks,   Holly Lorenzana MD   [facility]

## 2024-05-29 ENCOUNTER — PATIENT OUTREACH (OUTPATIENT)
Dept: CARE COORDINATION | Facility: CLINIC | Age: 64
End: 2024-05-29
Payer: COMMERCIAL

## 2024-05-29 NOTE — PROGRESS NOTES
Clinic Care Coordination Contact  Follow Up Progress Note      Assessment:  The pt was recently in the ED, I called to check up on the pt and help setup a ED follow up. I called the pt, but got her vm, so I left a vm for the pt to give me a call back.     Care Gaps:    Health Maintenance Due   Topic Date Due    SPIROMETRY  Never done    COPD ACTION PLAN  Never done    ZOSTER IMMUNIZATION (1 of 2) Never done    Pneumococcal Vaccine: 65+ Years (2 of 2 - PCV) 07/27/2012    RSV VACCINE (Pregnancy & 60+) (1 - 1-dose 60+ series) Never done    PHQ-2 (once per calendar year)  01/01/2024    COVID-19 Vaccine (7 - 2023-24 season) 04/11/2024    MAMMO SCREENING  04/22/2024           Care Plans      Intervention/Education provided during outreach:               Plan:     Care Coordinator will follow up in

## 2024-05-30 ENCOUNTER — PATIENT OUTREACH (OUTPATIENT)
Dept: CARE COORDINATION | Facility: CLINIC | Age: 64
End: 2024-05-30
Payer: COMMERCIAL

## 2024-05-31 ENCOUNTER — PATIENT OUTREACH (OUTPATIENT)
Dept: CARE COORDINATION | Facility: CLINIC | Age: 64
End: 2024-05-31
Payer: COMMERCIAL

## 2024-08-10 ENCOUNTER — HEALTH MAINTENANCE LETTER (OUTPATIENT)
Age: 64
End: 2024-08-10

## 2024-12-22 ENCOUNTER — HEALTH MAINTENANCE LETTER (OUTPATIENT)
Age: 64
End: 2024-12-22